# Patient Record
Sex: FEMALE | Race: WHITE | Employment: OTHER | URBAN - NONMETROPOLITAN AREA
[De-identification: names, ages, dates, MRNs, and addresses within clinical notes are randomized per-mention and may not be internally consistent; named-entity substitution may affect disease eponyms.]

---

## 2021-03-03 DIAGNOSIS — Z23 NEED FOR VACCINATION: ICD-10-CM

## 2023-01-30 ENCOUNTER — OFFICE VISIT (OUTPATIENT)
Dept: MEDICAL GROUP | Facility: PHYSICIAN GROUP | Age: 68
End: 2023-01-30
Payer: MEDICARE

## 2023-01-30 VITALS
HEART RATE: 76 BPM | TEMPERATURE: 97.1 F | SYSTOLIC BLOOD PRESSURE: 160 MMHG | RESPIRATION RATE: 16 BRPM | OXYGEN SATURATION: 95 % | DIASTOLIC BLOOD PRESSURE: 94 MMHG | HEIGHT: 63 IN | BODY MASS INDEX: 27.61 KG/M2 | WEIGHT: 155.8 LBS

## 2023-01-30 DIAGNOSIS — E03.9 HYPOTHYROIDISM, UNSPECIFIED TYPE: ICD-10-CM

## 2023-01-30 DIAGNOSIS — I10 PRIMARY HYPERTENSION: ICD-10-CM

## 2023-01-30 DIAGNOSIS — R91.1 PULMONARY NODULE 1 CM OR GREATER IN DIAMETER: ICD-10-CM

## 2023-01-30 DIAGNOSIS — M81.0 AGE-RELATED OSTEOPOROSIS WITHOUT CURRENT PATHOLOGICAL FRACTURE: ICD-10-CM

## 2023-01-30 DIAGNOSIS — N83.202 CYST OF LEFT OVARY: ICD-10-CM

## 2023-01-30 DIAGNOSIS — N18.2 STAGE 2 CHRONIC KIDNEY DISEASE: ICD-10-CM

## 2023-01-30 DIAGNOSIS — Z12.31 ENCOUNTER FOR SCREENING MAMMOGRAM FOR BREAST CANCER: ICD-10-CM

## 2023-01-30 DIAGNOSIS — R07.81 RIB PAIN ON LEFT SIDE: ICD-10-CM

## 2023-01-30 PROCEDURE — 99204 OFFICE O/P NEW MOD 45 MIN: CPT | Performed by: PHYSICIAN ASSISTANT

## 2023-01-30 RX ORDER — LEVOTHYROXINE SODIUM 112 UG/1
112 TABLET ORAL
COMMUNITY
End: 2024-02-09 | Stop reason: SDUPTHER

## 2023-01-30 RX ORDER — AMLODIPINE BESYLATE 5 MG/1
10 TABLET ORAL DAILY
COMMUNITY
Start: 2023-01-29 | End: 2023-02-16

## 2023-01-30 RX ORDER — CYCLOBENZAPRINE HCL 10 MG
1 TABLET ORAL 3 TIMES DAILY PRN
COMMUNITY
Start: 2023-01-29 | End: 2023-05-31

## 2023-01-30 RX ORDER — PROGESTERONE 100 MG/1
100 CAPSULE ORAL DAILY
COMMUNITY
End: 2023-12-06

## 2023-01-30 RX ORDER — ESTRADIOL 0.05 MG/D
1 PATCH TRANSDERMAL
COMMUNITY

## 2023-01-30 RX ORDER — IBUPROFEN 800 MG/1
800 TABLET ORAL
COMMUNITY
Start: 2023-01-29 | End: 2023-02-05

## 2023-01-30 RX ORDER — ALENDRONATE SODIUM 70 MG/1
70 TABLET ORAL
COMMUNITY
End: 2023-03-16

## 2023-01-30 ASSESSMENT — FIBROSIS 4 INDEX: FIB4 SCORE: 1.59

## 2023-01-30 NOTE — PROGRESS NOTES
CC:    Chief Complaint   Patient presents with    [facility]     Follow up ED        HISTORY OF THE PRESENT ILLNESS: Patient is a 68 y.o. female presenting to establish primary care     Pt has pain in L flank and L lower back after a fall while hiking [place]. Pain worsened a few days afterwards after working out at the gym.   Pt has hx of CKD stage 2.   Pt has hx of lung nodules found on CT. Initially seen about 1.5 years ago that showed the nodules. The repeat CT did not look concerning. She is planning to do f/u in a year.   Pt had recent cardiac workup in [place] in which she had CAC score of 0. Also had echo done with normal findings.   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   Pt on fosamax for osteoporosis. Last DEXA done one year ago.   Had colonoscopy in [place] in 2018.     No problem-specific Assessment & Plan notes found for this encounter.    Allergies: Midazolam and Promethazine    Current Outpatient Medications Ordered in Epic   Medication Sig Dispense Refill    amLODIPine (NORVASC) 5 MG Tab Take 5 mg by mouth every day.      cyclobenzaprine (FLEXERIL) 10 mg Tab Take 1 Tablet by mouth 3 times a day as needed.      levothyroxine (SYNTHROID) 112 MCG Tab Take 112 mcg by mouth every morning on an empty stomach.      ibuprofen (MOTRIN) 800 MG Tab Take 800 mg by mouth. (Patient not taking: Reported on 1/30/2023)       No current [facility]-ordered facility-administered medications on file.       No past medical history on file.    No past surgical history on file.    Social History     Tobacco Use    Smoking status: Never    Smokeless tobacco: Never   Vaping Use    Vaping Use: Never used   Substance Use Topics    Alcohol use: Never    Drug use: Never       Social History     Social History Narrative    Not on file       Family History   Problem Relation Age of Onset    Cancer Other         breast       ROS:     - Constitutional: Negative for  "fever, chills, unexpected weight change, and fatigue/generalized weakness.     - HEENT: Negative for headaches, vision changes, hearing changes, ear pain, ear discharge, rhinorrhea, sinus congestion, sore throat, and neck pain.      - Respiratory: Negative for cough, sputum production, chest congestion, dyspnea, wheezing, and crackles.      - Cardiovascular: Negative for chest pain, palpitations, orthopnea, and bilateral lower extremity edema.     - Gastrointestinal: Negative for heartburn, nausea, vomiting, abdominal pain, hematochezia, melena, diarrhea, constipation, and greasy/foul-smelling stools.     - Genitourinary: Negative for dysuria, polyuria, hematuria, pyuria, urinary urgency, and urinary incontinence.     - Musculoskeletal:Positive for rib pain. Negative for myalgias, back pain, and joint pain.     - Skin: Negative for rash, itching, cyanotic skin color change.     - Neurological: Negative for dizziness, tingling, tremors, focal sensory deficit, focal weakness and headaches.     - Endo/Heme/Allergies: Does not bruise/bleed easily.     - Psychiatric/Behavioral: Negative for depression, suicidal/homicidal ideation and memory loss.            .      Exam: BP (!) 160/94   Pulse 76   Temp 36.2 °C (97.1 °F) (Temporal)   Resp 16   Ht 1.6 m (5' 3\")   Wt 70.7 kg (155 lb 12.8 oz)   SpO2 95%  Body mass index is 27.6 kg/m².    General: Normal appearing. No acute distress.  Skin: Warm and dry.  No obvious lesions.  HEENT: Normocephalic. Eyes conjunctiva clear lids without ptosis, ears normal shape and contour  Cardiovascular: Regular rate and rhythm without murmur.   Respiratory: Clear to auscultation bilaterally, no rhonchi wheezing or rales.  Neurologic: Grossly nonfocal, A&O x3, gait normal,  Musculoskeletal: No deformity or swelling.   Extremities: No extremity cyanosis, clubbing, or edema.  Psych: Normal mood and affect. Judgment and insight is normal.    Please note that this dictation was created using " voice recognition software. I have made every reasonable attempt to correct obvious errors, but I expect that there are errors of grammar and possibly content that I did not discover before finalizing the note.      Assessment/Plan  1. Pulmonary nodule 1 cm or greater in diameter  We will get updated CT and review at next visit  - CT-CHEST (THORAX) W/O; Future    2. Cyst of left ovary  Referral placed  - Referral to OB/Gyn    3. Encounter for screening mammogram for breast cancer  Order placed  - MA-SCREENING MAMMO BILAT W/TOMOSYNTHESIS W/CAD; Future    4. Hypothyroidism, unspecified type  Continue levothyroxine at current dose.  Check TSH review at next visit  - TSH WITH REFLEX TO FT4; Future    5. Primary hypertension  I advised her to take another tablet of Norvasc when she gets home today.  She will take her blood pressure readings and we will review these in several weeks    6. Rib pain on left side  Advised this is likely a bruised rib    7. Age-related osteoporosis without current pathological fracture  Continue Fosamax    8. Stage 2 chronic kidney disease  Continue to monitor

## 2023-02-09 ENCOUNTER — HOSPITAL ENCOUNTER (OUTPATIENT)
Dept: RADIOLOGY | Facility: MEDICAL CENTER | Age: 68
End: 2023-02-09
Payer: MEDICARE

## 2023-02-13 ENCOUNTER — HOSPITAL ENCOUNTER (OUTPATIENT)
Dept: RADIOLOGY | Facility: MEDICAL CENTER | Age: 68
End: 2023-02-13
Attending: PHYSICIAN ASSISTANT
Payer: MEDICARE

## 2023-02-13 DIAGNOSIS — Z12.31 ENCOUNTER FOR SCREENING MAMMOGRAM FOR BREAST CANCER: ICD-10-CM

## 2023-02-13 PROCEDURE — 77063 BREAST TOMOSYNTHESIS BI: CPT

## 2023-02-16 ENCOUNTER — OFFICE VISIT (OUTPATIENT)
Dept: MEDICAL GROUP | Facility: PHYSICIAN GROUP | Age: 68
End: 2023-02-16
Payer: MEDICARE

## 2023-02-16 VITALS
RESPIRATION RATE: 14 BRPM | BODY MASS INDEX: 26.9 KG/M2 | SYSTOLIC BLOOD PRESSURE: 132 MMHG | DIASTOLIC BLOOD PRESSURE: 70 MMHG | HEART RATE: 89 BPM | WEIGHT: 151.8 LBS | HEIGHT: 63 IN | OXYGEN SATURATION: 97 % | TEMPERATURE: 98.2 F

## 2023-02-16 DIAGNOSIS — I10 PRIMARY HYPERTENSION: ICD-10-CM

## 2023-02-16 DIAGNOSIS — R91.1 PULMONARY NODULE 1 CM OR GREATER IN DIAMETER: ICD-10-CM

## 2023-02-16 DIAGNOSIS — E03.9 HYPOTHYROIDISM, UNSPECIFIED TYPE: ICD-10-CM

## 2023-02-16 PROCEDURE — 99214 OFFICE O/P EST MOD 30 MIN: CPT | Performed by: PHYSICIAN ASSISTANT

## 2023-02-16 RX ORDER — AMLODIPINE BESYLATE 10 MG/1
10 TABLET ORAL DAILY
Qty: 90 TABLET | Refills: 1 | Status: SHIPPED | OUTPATIENT
Start: 2023-02-16 | End: 2023-10-31 | Stop reason: SDUPTHER

## 2023-02-16 ASSESSMENT — FIBROSIS 4 INDEX: FIB4 SCORE: 1.59

## 2023-02-16 ASSESSMENT — PATIENT HEALTH QUESTIONNAIRE - PHQ9: CLINICAL INTERPRETATION OF PHQ2 SCORE: 0

## 2023-02-16 NOTE — PROGRESS NOTES
CC:  Chief Complaint   Patient presents with    Follow-Up     BP follow up.        HISTORY OF PRESENT ILLNESS: Patient is a 68 y.o. female established patient presenting for recheck  Pt has been checking her home BP. Has been on norvasc 10mg and readings have been good overall.   Pt's abdominal CT indicates seeming no changes to her pulmonary nodules from when previous CT chest done in October 2021.   Pt had mammo done with R breast asymmetry. Diagnostic mammo scheduled next week.     No problem-specific Assessment & Plan notes found for this encounter.      Patient Active Problem List    Diagnosis Date Noted    Hypothyroidism 01/30/2023    Cyst of left ovary 01/30/2023    Pulmonary nodule 1 cm or greater in diameter 01/30/2023    Primary hypertension 01/30/2023    Age-related osteoporosis without current pathological fracture 01/30/2023    Stage 2 chronic kidney disease 01/30/2023      Allergies:Midazolam and Promethazine    Current Outpatient Medications   Medication Sig Dispense Refill    levothyroxine (SYNTHROID) 112 MCG Tab Take 112 mcg by mouth every morning on an empty stomach.      progesterone (PROMETRIUM) 100 MG Cap Take 100 mg by mouth every day.      estradiol (CLIMARA) 0.05 MG/24HR PATCH WEEKLY Place 1 Patch on the skin every 7 days.      alendronate (FOSAMAX) 70 MG Tab Take 70 mg by mouth every 7 days.      amLODIPine (NORVASC) 5 MG Tab Take 10 mg by mouth every day.      cyclobenzaprine (FLEXERIL) 10 mg Tab Take 1 Tablet by mouth 3 times a day as needed. (Patient not taking: Reported on 2/16/2023)       No current facility-administered medications for this visit.       Social History     Tobacco Use    Smoking status: Never    Smokeless tobacco: Never   Vaping Use    Vaping Use: Never used   Substance Use Topics    Alcohol use: Never    Drug use: Never     Social History     Social History Narrative    Not on file       Family History   Problem Relation Age of Onset    Cancer Other         breast  "       ROS:     - Constitutional:  Negative for fever, chills, unexpected weight change, and fatigue/generalized weakness.    - HEENT:  Negative for headaches, vision changes, hearing changes, ear pain, ear discharge, rhinorrhea, sinus congestion, sore throat, and neck pain.      - Respiratory: Negative for cough, sputum production, chest congestion, dyspnea, wheezing, and crackles.      - Cardiovascular: Negative for chest pain, palpitations, orthopnea, and bilateral lower extremity edema.        Exam:    /70 (BP Location: Right arm, Patient Position: Sitting, BP Cuff Size: Adult)   Pulse 89   Temp 36.8 °C (98.2 °F) (Temporal)   Resp 14   Ht 1.588 m (5' 2.5\")   Wt 68.9 kg (151 lb 12.8 oz)   SpO2 97%  Body mass index is 27.32 kg/m².    General:  Well nourished, well developed female in NAD  Head is grossly normal.  Neck: Supple. Thyroid is not enlarged.  Skin: Warm and dry. No obvious lesions  Neuro: Normal muscle tone. Gait normal. Alert and oriented.  Psych: Normal mood and affect      Please note that this dictation was created using voice recognition software. I have made every reasonable attempt to correct obvious errors, but I expect that there are errors of grammar and possibly content that I did not discover before finalizing the note.        Assessment/Plan:  1. Primary hypertension  Continue with norvasc 10mg  - amLODIPine (NORVASC) 10 MG Tab; Take 1 Tablet by mouth every day for 180 days.  Dispense: 90 Tablet; Refill: 1    2. Hypothyroidism, unspecified type  Continue with levothyroxine at current dose.     3. Pulmonary nodule 1 cm or greater in diameter  Will get chest CT done in one year since her abdominal CT visualized the nodules in question and did not appear to change since imaging report in October 2021           "

## 2023-02-22 ENCOUNTER — HOSPITAL ENCOUNTER (OUTPATIENT)
Dept: RADIOLOGY | Facility: MEDICAL CENTER | Age: 68
End: 2023-02-22
Attending: PHYSICIAN ASSISTANT
Payer: MEDICARE

## 2023-02-22 DIAGNOSIS — R92.8 ABNORMAL MAMMOGRAM: ICD-10-CM

## 2023-02-22 PROCEDURE — 76642 ULTRASOUND BREAST LIMITED: CPT | Mod: RT

## 2023-02-22 PROCEDURE — G0279 TOMOSYNTHESIS, MAMMO: HCPCS

## 2023-02-28 ENCOUNTER — APPOINTMENT (OUTPATIENT)
Dept: MEDICAL GROUP | Facility: PHYSICIAN GROUP | Age: 68
End: 2023-02-28
Payer: MEDICARE

## 2023-03-16 RX ORDER — ALENDRONATE SODIUM 70 MG/1
TABLET ORAL
Qty: 12 TABLET | Refills: 0 | Status: SHIPPED | OUTPATIENT
Start: 2023-03-16 | End: 2023-06-14

## 2023-05-31 ENCOUNTER — OFFICE VISIT (OUTPATIENT)
Dept: MEDICAL GROUP | Facility: PHYSICIAN GROUP | Age: 68
End: 2023-05-31
Payer: MEDICARE

## 2023-05-31 VITALS
DIASTOLIC BLOOD PRESSURE: 76 MMHG | HEIGHT: 63 IN | TEMPERATURE: 97.5 F | HEART RATE: 83 BPM | SYSTOLIC BLOOD PRESSURE: 142 MMHG | WEIGHT: 156.8 LBS | BODY MASS INDEX: 27.78 KG/M2 | OXYGEN SATURATION: 97 %

## 2023-05-31 DIAGNOSIS — R73.09 ELEVATED GLUCOSE: ICD-10-CM

## 2023-05-31 DIAGNOSIS — E55.9 VITAMIN D DEFICIENCY: ICD-10-CM

## 2023-05-31 DIAGNOSIS — I70.0 ATHEROSCLEROSIS OF ABDOMINAL AORTA (HCC): ICD-10-CM

## 2023-05-31 DIAGNOSIS — E78.2 MIXED HYPERLIPIDEMIA: ICD-10-CM

## 2023-05-31 DIAGNOSIS — E03.9 HYPOTHYROIDISM, UNSPECIFIED TYPE: ICD-10-CM

## 2023-05-31 DIAGNOSIS — Z15.89 APOE*3/*4 GENOTYPE: ICD-10-CM

## 2023-05-31 DIAGNOSIS — N83.202 CYST OF LEFT OVARY: ICD-10-CM

## 2023-05-31 DIAGNOSIS — I10 PRIMARY HYPERTENSION: ICD-10-CM

## 2023-05-31 PROCEDURE — 3077F SYST BP >= 140 MM HG: CPT | Performed by: PHYSICIAN ASSISTANT

## 2023-05-31 PROCEDURE — 99214 OFFICE O/P EST MOD 30 MIN: CPT | Performed by: PHYSICIAN ASSISTANT

## 2023-05-31 PROCEDURE — 3078F DIAST BP <80 MM HG: CPT | Performed by: PHYSICIAN ASSISTANT

## 2023-05-31 RX ORDER — ROSUVASTATIN CALCIUM 10 MG/1
10 TABLET, COATED ORAL DAILY
Qty: 90 TABLET | Refills: 3 | Status: SHIPPED | OUTPATIENT
Start: 2023-05-31

## 2023-05-31 RX ORDER — LOSARTAN POTASSIUM 25 MG/1
25 TABLET ORAL DAILY
Qty: 90 TABLET | Refills: 3 | Status: SHIPPED | OUTPATIENT
Start: 2023-05-31

## 2023-05-31 ASSESSMENT — FIBROSIS 4 INDEX: FIB4 SCORE: 1.59

## 2023-05-31 NOTE — PROGRESS NOTES
"CC:  Chief Complaint   Patient presents with    Follow-Up       HISTORY OF PRESENT ILLNESS: Patient is a 68 y.o. female established patient presenting with issues below  Requesting OBGYN referral to [facility]  Pt threw away her Crestor because she is concerned about dementia. Has ApoE positive test.   Pt follows a neurologist in [facility] to help her prevent dementia.  There are several labs that she is requesting to have done.    Current Outpatient Medications   Medication Sig Dispense Refill    alendronate (FOSAMAX) 70 MG Tab TAKE ONE TABLET BY MOUTH WEEKLY 12 Tablet 0    amLODIPine (NORVASC) 10 MG Tab Take 1 Tablet by mouth every day for 180 days. 90 Tablet 1    levothyroxine (SYNTHROID) 112 MCG Tab Take 112 mcg by mouth every morning on an empty stomach.      progesterone (PROMETRIUM) 100 MG Cap Take 100 mg by mouth every day.      estradiol (CLIMARA) 0.05 MG/24HR PATCH WEEKLY Place 1 Patch on the skin every 7 days.       No current facility-administered medications for this visit.        ROS:     ROS    Exam:    BP (!) 142/76   Pulse 83   Temp 36.4 °C (97.5 °F) (Temporal)   Ht 1.6 m (5' 3\")   Wt 71.1 kg (156 lb 12.8 oz)   SpO2 97%  Body mass index is 27.78 kg/m².    General:  Well nourished, well developed female in NAD  Head is grossly normal.  Neck: Supple.   Skin: Warm and dry. No obvious lesions  Neuro: Normal muscle tone. Gait normal. Alert and oriented.  Psych: Normal mood and affect      Please note that this dictation was created using voice recognition software. I have made every reasonable attempt to correct obvious errors, but I expect that there are errors of grammar and possibly content that I did not discover before finalizing the note.        Assessment/Plan:    1. Cyst of left ovary  Referral placed  - Referral to OB/Gyn    2. Mixed hyperlipidemia  Start on Crestor 10 mg.  She had a CAC score of 0 but has abdominal aortic atherosclerosis.  - Lipid Profile; Future  - " rosuvastatin (CRESTOR) 10 MG Tab; Take 1 Tablet by mouth every day.  Dispense: 90 Tablet; Refill: 3    3. APOE*3/*4 genotype    - CRP HIGH SENSITIVE  - FERRITIN; Future  - HOMOCYSTEINE; Future    4. Primary hypertension  Her blood pressure today is elevated.  We will add on losartan 25 mg.  Recheck in 1 month  - CBC WITH DIFFERENTIAL; Future  - Comp Metabolic Panel; Future  - losartan (COZAAR) 25 MG Tab; Take 1 Tablet by mouth every day.  Dispense: 90 Tablet; Refill: 3    5. Hypothyroidism, unspecified type  Continue to monitor  - TSH WITH REFLEX TO FT4; Future    6. Vitamin D deficiency  Continue to monitor  - VITAMIN D,25 HYDROXY (DEFICIENCY); Future    7. Elevated glucose    - HEMOGLOBIN A1C; Future  - INSULIN FASTING; Future    8. Atherosclerosis of abdominal aorta (HCC)  Start on Crestor.

## 2023-06-08 ENCOUNTER — HOSPITAL ENCOUNTER (OUTPATIENT)
Dept: LAB | Facility: MEDICAL CENTER | Age: 68
End: 2023-06-08
Attending: PHYSICIAN ASSISTANT
Payer: MEDICARE

## 2023-06-08 DIAGNOSIS — E55.9 VITAMIN D DEFICIENCY: ICD-10-CM

## 2023-06-08 DIAGNOSIS — E03.9 HYPOTHYROIDISM, UNSPECIFIED TYPE: ICD-10-CM

## 2023-06-08 DIAGNOSIS — R73.09 ELEVATED GLUCOSE: ICD-10-CM

## 2023-06-08 DIAGNOSIS — I10 PRIMARY HYPERTENSION: ICD-10-CM

## 2023-06-08 DIAGNOSIS — E78.2 MIXED HYPERLIPIDEMIA: ICD-10-CM

## 2023-06-08 DIAGNOSIS — Z15.89 APOE*3/*4 GENOTYPE: ICD-10-CM

## 2023-06-08 LAB
25(OH)D3 SERPL-MCNC: 62 NG/ML (ref 30–100)
ALBUMIN SERPL BCP-MCNC: 4.4 G/DL (ref 3.2–4.9)
ALBUMIN/GLOB SERPL: 1.8 G/DL
ALP SERPL-CCNC: 44 U/L (ref 30–99)
ALT SERPL-CCNC: 16 U/L (ref 2–50)
ANION GAP SERPL CALC-SCNC: 13 MMOL/L (ref 7–16)
AST SERPL-CCNC: 15 U/L (ref 12–45)
BASOPHILS # BLD AUTO: 0.9 % (ref 0–1.8)
BASOPHILS # BLD: 0.05 K/UL (ref 0–0.12)
BILIRUB SERPL-MCNC: 0.6 MG/DL (ref 0.1–1.5)
BUN SERPL-MCNC: 29 MG/DL (ref 8–22)
CALCIUM ALBUM COR SERPL-MCNC: 9.3 MG/DL (ref 8.5–10.5)
CALCIUM SERPL-MCNC: 9.6 MG/DL (ref 8.5–10.5)
CHLORIDE SERPL-SCNC: 105 MMOL/L (ref 96–112)
CHOLEST SERPL-MCNC: 243 MG/DL (ref 100–199)
CO2 SERPL-SCNC: 22 MMOL/L (ref 20–33)
CREAT SERPL-MCNC: 0.93 MG/DL (ref 0.5–1.4)
CRP SERPL HS-MCNC: 0.9 MG/L (ref 0–3)
EOSINOPHIL # BLD AUTO: 0.16 K/UL (ref 0–0.51)
EOSINOPHIL NFR BLD: 3 % (ref 0–6.9)
ERYTHROCYTE [DISTWIDTH] IN BLOOD BY AUTOMATED COUNT: 38.4 FL (ref 35.9–50)
EST. AVERAGE GLUCOSE BLD GHB EST-MCNC: 88 MG/DL
FERRITIN SERPL-MCNC: 32.2 NG/ML (ref 10–291)
GFR SERPLBLD CREATININE-BSD FMLA CKD-EPI: 66 ML/MIN/1.73 M 2
GLOBULIN SER CALC-MCNC: 2.4 G/DL (ref 1.9–3.5)
GLUCOSE SERPL-MCNC: 105 MG/DL (ref 65–99)
HBA1C MFR BLD: 4.7 % (ref 4–5.6)
HCT VFR BLD AUTO: 45.7 % (ref 37–47)
HCYS SERPL-SCNC: 8.72 UMOL/L
HDLC SERPL-MCNC: 89 MG/DL
HGB BLD-MCNC: 15.7 G/DL (ref 12–16)
IMM GRANULOCYTES # BLD AUTO: 0.01 K/UL (ref 0–0.11)
IMM GRANULOCYTES NFR BLD AUTO: 0.2 % (ref 0–0.9)
LDLC SERPL CALC-MCNC: 143 MG/DL
LYMPHOCYTES # BLD AUTO: 1.6 K/UL (ref 1–4.8)
LYMPHOCYTES NFR BLD: 30.1 % (ref 22–41)
MCH RBC QN AUTO: 30 PG (ref 27–33)
MCHC RBC AUTO-ENTMCNC: 34.4 G/DL (ref 32.2–35.5)
MCV RBC AUTO: 87.2 FL (ref 81.4–97.8)
MONOCYTES # BLD AUTO: 0.39 K/UL (ref 0–0.85)
MONOCYTES NFR BLD AUTO: 7.3 % (ref 0–13.4)
NEUTROPHILS # BLD AUTO: 3.11 K/UL (ref 1.82–7.42)
NEUTROPHILS NFR BLD: 58.5 % (ref 44–72)
NRBC # BLD AUTO: 0 K/UL
NRBC BLD-RTO: 0 /100 WBC (ref 0–0.2)
PLATELET # BLD AUTO: 229 K/UL (ref 164–446)
PMV BLD AUTO: 10.5 FL (ref 9–12.9)
POTASSIUM SERPL-SCNC: 4.2 MMOL/L (ref 3.6–5.5)
PROT SERPL-MCNC: 6.8 G/DL (ref 6–8.2)
RBC # BLD AUTO: 5.24 M/UL (ref 4.2–5.4)
SODIUM SERPL-SCNC: 140 MMOL/L (ref 135–145)
TRIGL SERPL-MCNC: 54 MG/DL (ref 0–149)
TSH SERPL DL<=0.005 MIU/L-ACNC: 0.49 UIU/ML (ref 0.38–5.33)
WBC # BLD AUTO: 5.3 K/UL (ref 4.8–10.8)

## 2023-06-08 PROCEDURE — 36415 COLL VENOUS BLD VENIPUNCTURE: CPT

## 2023-06-08 PROCEDURE — 82728 ASSAY OF FERRITIN: CPT | Mod: GA

## 2023-06-08 PROCEDURE — 84443 ASSAY THYROID STIM HORMONE: CPT

## 2023-06-08 PROCEDURE — 83090 ASSAY OF HOMOCYSTEINE: CPT

## 2023-06-08 PROCEDURE — 86141 C-REACTIVE PROTEIN HS: CPT

## 2023-06-08 PROCEDURE — 85025 COMPLETE CBC W/AUTO DIFF WBC: CPT

## 2023-06-08 PROCEDURE — 80053 COMPREHEN METABOLIC PANEL: CPT

## 2023-06-08 PROCEDURE — 83036 HEMOGLOBIN GLYCOSYLATED A1C: CPT | Mod: GA

## 2023-06-08 PROCEDURE — 82306 VITAMIN D 25 HYDROXY: CPT

## 2023-06-08 PROCEDURE — 80061 LIPID PANEL: CPT

## 2023-06-08 PROCEDURE — 83525 ASSAY OF INSULIN: CPT

## 2023-06-09 LAB — INSULIN P FAST SERPL-ACNC: 7 UIU/ML (ref 3–25)

## 2023-06-12 ENCOUNTER — TELEPHONE (OUTPATIENT)
Dept: HEALTH INFORMATION MANAGEMENT | Facility: OTHER | Age: 68
End: 2023-06-12
Payer: MEDICARE

## 2023-06-14 RX ORDER — ALENDRONATE SODIUM 70 MG/1
TABLET ORAL
Qty: 12 TABLET | Refills: 0 | Status: SHIPPED
Start: 2023-06-14 | End: 2023-08-25

## 2023-06-22 ENCOUNTER — TELEPHONE (OUTPATIENT)
Dept: SCHEDULING | Facility: IMAGING CENTER | Age: 68
End: 2023-06-22

## 2023-06-22 DIAGNOSIS — N28.1 RENAL CYST: ICD-10-CM

## 2023-06-23 ENCOUNTER — TELEPHONE (OUTPATIENT)
Dept: MEDICAL GROUP | Facility: PHYSICIAN GROUP | Age: 68
End: 2023-06-23
Payer: MEDICARE

## 2023-06-23 ENCOUNTER — HOSPITAL ENCOUNTER (OUTPATIENT)
Dept: RADIOLOGY | Facility: MEDICAL CENTER | Age: 68
End: 2023-06-23
Attending: STUDENT IN AN ORGANIZED HEALTH CARE EDUCATION/TRAINING PROGRAM
Payer: MEDICARE

## 2023-06-23 DIAGNOSIS — N28.1 RENAL CYST, RIGHT: ICD-10-CM

## 2023-06-23 NOTE — TELEPHONE ENCOUNTER
Spoke to radiology this morning at [facility] to go over her recent CT abdomen with contrast scan done back in January showing a renal cyst.  The cyst is about 3.9 cm and the radiologist describes as a simple cyst with no need for follow-up imaging.  Spoke with the patient about this and conveyed this information to her.  She would like to get an ultrasound for follow-up imaging to see if it is getting any larger.  We will go forward and order an ultrasound, canceled the recent CT scan as well.    Grace Simms DO

## 2023-06-30 ENCOUNTER — HOSPITAL ENCOUNTER (OUTPATIENT)
Dept: RADIOLOGY | Facility: MEDICAL CENTER | Age: 68
End: 2023-06-30
Attending: STUDENT IN AN ORGANIZED HEALTH CARE EDUCATION/TRAINING PROGRAM
Payer: MEDICARE

## 2023-06-30 DIAGNOSIS — N28.1 RENAL CYST, RIGHT: ICD-10-CM

## 2023-06-30 PROCEDURE — 76775 US EXAM ABDO BACK WALL LIM: CPT

## 2023-07-03 ENCOUNTER — APPOINTMENT (OUTPATIENT)
Dept: MEDICAL GROUP | Facility: PHYSICIAN GROUP | Age: 68
End: 2023-07-03
Payer: MEDICARE

## 2023-07-03 ENCOUNTER — PATIENT MESSAGE (OUTPATIENT)
Dept: MEDICAL GROUP | Facility: PHYSICIAN GROUP | Age: 68
End: 2023-07-03

## 2023-07-03 NOTE — TELEPHONE ENCOUNTER
----- Message from Rodríguez Simms D.O. sent at 6/30/2023  5:22 PM PDT -----  US again demonstrates a simply kidney cyst. No follow up needed    Grace Simms DO

## 2023-07-09 SDOH — ECONOMIC STABILITY: HOUSING INSECURITY: IN THE LAST 12 MONTHS, HOW MANY PLACES HAVE YOU LIVED?: 3

## 2023-07-09 SDOH — ECONOMIC STABILITY: HOUSING INSECURITY
IN THE LAST 12 MONTHS, WAS THERE A TIME WHEN YOU DID NOT HAVE A STEADY PLACE TO SLEEP OR SLEPT IN A SHELTER (INCLUDING NOW)?: NO

## 2023-07-09 SDOH — ECONOMIC STABILITY: TRANSPORTATION INSECURITY
IN THE PAST 12 MONTHS, HAS THE LACK OF TRANSPORTATION KEPT YOU FROM MEDICAL APPOINTMENTS OR FROM GETTING MEDICATIONS?: NO

## 2023-07-09 SDOH — ECONOMIC STABILITY: FOOD INSECURITY: WITHIN THE PAST 12 MONTHS, THE FOOD YOU BOUGHT JUST DIDN'T LAST AND YOU DIDN'T HAVE MONEY TO GET MORE.: NEVER TRUE

## 2023-07-09 SDOH — HEALTH STABILITY: PHYSICAL HEALTH: ON AVERAGE, HOW MANY MINUTES DO YOU ENGAGE IN EXERCISE AT THIS LEVEL?: 130 MIN

## 2023-07-09 SDOH — ECONOMIC STABILITY: INCOME INSECURITY: HOW HARD IS IT FOR YOU TO PAY FOR THE VERY BASICS LIKE FOOD, HOUSING, MEDICAL CARE, AND HEATING?: NOT VERY HARD

## 2023-07-09 SDOH — ECONOMIC STABILITY: TRANSPORTATION INSECURITY
IN THE PAST 12 MONTHS, HAS LACK OF RELIABLE TRANSPORTATION KEPT YOU FROM MEDICAL APPOINTMENTS, MEETINGS, WORK OR FROM GETTING THINGS NEEDED FOR DAILY LIVING?: NO

## 2023-07-09 SDOH — ECONOMIC STABILITY: INCOME INSECURITY: IN THE LAST 12 MONTHS, WAS THERE A TIME WHEN YOU WERE NOT ABLE TO PAY THE MORTGAGE OR RENT ON TIME?: NO

## 2023-07-09 SDOH — HEALTH STABILITY: PHYSICAL HEALTH: ON AVERAGE, HOW MANY DAYS PER WEEK DO YOU ENGAGE IN MODERATE TO STRENUOUS EXERCISE (LIKE A BRISK WALK)?: 7 DAYS

## 2023-07-09 SDOH — HEALTH STABILITY: MENTAL HEALTH
STRESS IS WHEN SOMEONE FEELS TENSE, NERVOUS, ANXIOUS, OR CAN'T SLEEP AT NIGHT BECAUSE THEIR MIND IS TROUBLED. HOW STRESSED ARE YOU?: TO SOME EXTENT

## 2023-07-09 SDOH — ECONOMIC STABILITY: FOOD INSECURITY: WITHIN THE PAST 12 MONTHS, YOU WORRIED THAT YOUR FOOD WOULD RUN OUT BEFORE YOU GOT MONEY TO BUY MORE.: NEVER TRUE

## 2023-07-09 SDOH — ECONOMIC STABILITY: TRANSPORTATION INSECURITY
IN THE PAST 12 MONTHS, HAS LACK OF TRANSPORTATION KEPT YOU FROM MEETINGS, WORK, OR FROM GETTING THINGS NEEDED FOR DAILY LIVING?: NO

## 2023-07-09 ASSESSMENT — SOCIAL DETERMINANTS OF HEALTH (SDOH)
HOW OFTEN DO YOU ATTEND CHURCH OR RELIGIOUS SERVICES?: NEVER
HOW HARD IS IT FOR YOU TO PAY FOR THE VERY BASICS LIKE FOOD, HOUSING, MEDICAL CARE, AND HEATING?: NOT VERY HARD
DO YOU BELONG TO ANY CLUBS OR ORGANIZATIONS SUCH AS CHURCH GROUPS UNIONS, FRATERNAL OR ATHLETIC GROUPS, OR SCHOOL GROUPS?: YES
HOW OFTEN DO YOU ATTEND CHURCH OR RELIGIOUS SERVICES?: NEVER
HOW OFTEN DO YOU ATTENT MEETINGS OF THE CLUB OR ORGANIZATION YOU BELONG TO?: 1 TO 4 TIMES PER YEAR
HOW OFTEN DO YOU HAVE SIX OR MORE DRINKS ON ONE OCCASION: NEVER
HOW OFTEN DO YOU ATTENT MEETINGS OF THE CLUB OR ORGANIZATION YOU BELONG TO?: 1 TO 4 TIMES PER YEAR
HOW OFTEN DO YOU GET TOGETHER WITH FRIENDS OR RELATIVES?: ONCE A WEEK
IN A TYPICAL WEEK, HOW MANY TIMES DO YOU TALK ON THE PHONE WITH FAMILY, FRIENDS, OR NEIGHBORS?: PATIENT DECLINED
IN A TYPICAL WEEK, HOW MANY TIMES DO YOU TALK ON THE PHONE WITH FAMILY, FRIENDS, OR NEIGHBORS?: PATIENT DECLINED
HOW OFTEN DO YOU GET TOGETHER WITH FRIENDS OR RELATIVES?: ONCE A WEEK
WITHIN THE PAST 12 MONTHS, YOU WORRIED THAT YOUR FOOD WOULD RUN OUT BEFORE YOU GOT THE MONEY TO BUY MORE: NEVER TRUE
HOW MANY DRINKS CONTAINING ALCOHOL DO YOU HAVE ON A TYPICAL DAY WHEN YOU ARE DRINKING: PATIENT DOES NOT DRINK
DO YOU BELONG TO ANY CLUBS OR ORGANIZATIONS SUCH AS CHURCH GROUPS UNIONS, FRATERNAL OR ATHLETIC GROUPS, OR SCHOOL GROUPS?: YES
HOW OFTEN DO YOU HAVE A DRINK CONTAINING ALCOHOL: NEVER

## 2023-07-09 ASSESSMENT — LIFESTYLE VARIABLES
AUDIT-C TOTAL SCORE: 0
HOW MANY STANDARD DRINKS CONTAINING ALCOHOL DO YOU HAVE ON A TYPICAL DAY: PATIENT DOES NOT DRINK
HOW OFTEN DO YOU HAVE SIX OR MORE DRINKS ON ONE OCCASION: NEVER
HOW OFTEN DO YOU HAVE A DRINK CONTAINING ALCOHOL: NEVER
SKIP TO QUESTIONS 9-10: 1

## 2023-07-10 ENCOUNTER — APPOINTMENT (OUTPATIENT)
Dept: MEDICAL GROUP | Facility: PHYSICIAN GROUP | Age: 68
End: 2023-07-10
Payer: MEDICARE

## 2023-08-10 ENCOUNTER — OFFICE VISIT (OUTPATIENT)
Dept: MEDICAL GROUP | Facility: PHYSICIAN GROUP | Age: 68
End: 2023-08-10
Payer: MEDICARE

## 2023-08-10 VITALS
WEIGHT: 156.31 LBS | HEIGHT: 63 IN | RESPIRATION RATE: 12 BRPM | OXYGEN SATURATION: 97 % | BODY MASS INDEX: 27.7 KG/M2 | DIASTOLIC BLOOD PRESSURE: 74 MMHG | TEMPERATURE: 97.7 F | SYSTOLIC BLOOD PRESSURE: 144 MMHG | HEART RATE: 101 BPM

## 2023-08-10 DIAGNOSIS — N28.1 SIMPLE RENAL CYST: ICD-10-CM

## 2023-08-10 DIAGNOSIS — Z15.89 APOE*3/*4 GENOTYPE: ICD-10-CM

## 2023-08-10 DIAGNOSIS — M81.0 AGE-RELATED OSTEOPOROSIS WITHOUT CURRENT PATHOLOGICAL FRACTURE: ICD-10-CM

## 2023-08-10 DIAGNOSIS — I10 PRIMARY HYPERTENSION: ICD-10-CM

## 2023-08-10 DIAGNOSIS — E78.5 HYPERLIPIDEMIA, UNSPECIFIED HYPERLIPIDEMIA TYPE: ICD-10-CM

## 2023-08-10 DIAGNOSIS — N83.202 CYST OF LEFT OVARY: ICD-10-CM

## 2023-08-10 DIAGNOSIS — R91.1 PULMONARY NODULE 1 CM OR GREATER IN DIAMETER: ICD-10-CM

## 2023-08-10 DIAGNOSIS — E03.9 HYPOTHYROIDISM, UNSPECIFIED TYPE: ICD-10-CM

## 2023-08-10 DIAGNOSIS — N18.2 STAGE 2 CHRONIC KIDNEY DISEASE: ICD-10-CM

## 2023-08-10 DIAGNOSIS — E78.2 MIXED HYPERLIPIDEMIA: ICD-10-CM

## 2023-08-10 PROCEDURE — 3077F SYST BP >= 140 MM HG: CPT | Performed by: STUDENT IN AN ORGANIZED HEALTH CARE EDUCATION/TRAINING PROGRAM

## 2023-08-10 PROCEDURE — 3078F DIAST BP <80 MM HG: CPT | Performed by: STUDENT IN AN ORGANIZED HEALTH CARE EDUCATION/TRAINING PROGRAM

## 2023-08-10 PROCEDURE — 99214 OFFICE O/P EST MOD 30 MIN: CPT | Performed by: STUDENT IN AN ORGANIZED HEALTH CARE EDUCATION/TRAINING PROGRAM

## 2023-08-10 RX ORDER — EZETIMIBE 10 MG/1
10 TABLET ORAL DAILY
Qty: 30 TABLET | Refills: 2 | Status: SHIPPED | OUTPATIENT
Start: 2023-08-10 | End: 2023-11-07

## 2023-08-10 ASSESSMENT — ENCOUNTER SYMPTOMS
ORTHOPNEA: 0
NAUSEA: 0
SHORTNESS OF BREATH: 0
CHILLS: 0
ABDOMINAL PAIN: 0
PALPITATIONS: 0
HEADACHES: 0
COUGH: 0
WHEEZING: 0
VOMITING: 0
FEVER: 0
DIZZINESS: 0
FOCAL WEAKNESS: 0

## 2023-08-10 ASSESSMENT — FIBROSIS 4 INDEX: FIB4 SCORE: 1.15

## 2023-08-10 NOTE — ASSESSMENT & PLAN NOTE
US 8/2023:  Impression      1.  Relatively stable simple appearing left ovarian cyst.   2.  Normal right ovary and uterus.   3.  Consider ongoing surveillance ultrasound follow-up of the cyst in 6-12   months.       Wants to be referred to Gyn Dr. Diaz

## 2023-08-10 NOTE — ASSESSMENT & PLAN NOTE
ASCVD risk about 1%.  She reports that she is currently on Crestor 10 mg daily but would not like to increase this medication.  Instead she would like to add Zetia 10 mg daily which she heard also helps with the disease process of Alzheimer disease as she is apoe positive

## 2023-08-10 NOTE — ASSESSMENT & PLAN NOTE
Media in chart, 2 nodules 8 mm and 16 mm that are stable images 1 year apart.  Can consider imaging again in 2 to 3 years.

## 2023-08-10 NOTE — PROGRESS NOTES
HISTORY OF PRESENT ILLNESS: Estefani is a pleasant 68 y.o. female, established patient who presents today to discuss medical problems as listed below:    Problem   Simple Renal Cyst    R side seen on US 6/2023, no follow up needed. Simple cyst.      Hyperlipidemia   Hypothyroidism   Cyst of Left Ovary   Pulmonary Nodule 1 Cm Or Greater in Diameter   Primary Hypertension    History of whitecoat hypertension.  At home about 1 20-1 30 systolic over 80 diastolic.  Currently on amlodipine 10 mg daily, losartan 25 mg daily     Age-Related Osteoporosis Without Current Pathological Fracture    Has been on alendronate for 1.5 years     Next dexa scan 6 months from now                                Stage 2 Chronic Kidney Disease        Current Outpatient Medications Ordered in Epic   Medication Sig Dispense Refill    ezetimibe (ZETIA) 10 MG Tab Take 1 Tablet by mouth every day. 30 Tablet 2    alendronate (FOSAMAX) 70 MG Tab TAKE 1 TABLET BY MOUTH WEEKLY 12 Tablet 0    rosuvastatin (CRESTOR) 10 MG Tab Take 1 Tablet by mouth every day. 90 Tablet 3    losartan (COZAAR) 25 MG Tab Take 1 Tablet by mouth every day. 90 Tablet 3    amLODIPine (NORVASC) 10 MG Tab Take 1 Tablet by mouth every day for 180 days. 90 Tablet 1    levothyroxine (SYNTHROID) 112 MCG Tab Take 112 mcg by mouth every morning on an empty stomach.      progesterone (PROMETRIUM) 100 MG Cap Take 100 mg by mouth every day.      estradiol (CLIMARA) 0.05 MG/24HR PATCH WEEKLY Place 1 Patch on the skin every 7 days.       No current [facility]-ordered facility-administered medications on file.       Review of systems:  Review of Systems   Constitutional:  Negative for chills and fever.   Respiratory:  Negative for cough, shortness of breath and wheezing.    Cardiovascular:  Negative for chest pain, palpitations, orthopnea and leg swelling.   Gastrointestinal:  Negative for abdominal pain, nausea and vomiting.   Genitourinary:  Negative for dysuria.   Musculoskeletal:  Negative  "for joint pain.   Neurological:  Negative for dizziness, focal weakness and headaches.         Past Medical History:   Diagnosis Date    Hyperlipidemia     Hypertension     Kidney disease     Thyroid disease      Past Surgical History:   Procedure Laterality Date    PRIMARY C SECTION      TUBAL COAGULATION LAPAROSCOPIC BILATERAL       Social History     Tobacco Use    Smoking status: Never    Smokeless tobacco: Never    Tobacco comments:     2 months in college   Vaping Use    Vaping Use: Never used   Substance Use Topics    Alcohol use: Not Currently     Comment: Neuro toxin - I do not drink    Drug use: Never      Family History   Problem Relation Age of Onset    Cancer Other         breast    Heart Disease Mother         heart failure    Hypertension Father         high blood pressure    Hyperlipidemia Father         high blood pressure    Genetic Disorder Maternal Grandmother         Dementia - I am a APOE4 heterozygote    Genetic Disorder Maternal Aunt         Dementia    Genetic Disorder Maternal Uncle         Dementia     Current Outpatient Medications   Medication Sig Dispense Refill    ezetimibe (ZETIA) 10 MG Tab Take 1 Tablet by mouth every day. 30 Tablet 2    alendronate (FOSAMAX) 70 MG Tab TAKE 1 TABLET BY MOUTH WEEKLY 12 Tablet 0    rosuvastatin (CRESTOR) 10 MG Tab Take 1 Tablet by mouth every day. 90 Tablet 3    losartan (COZAAR) 25 MG Tab Take 1 Tablet by mouth every day. 90 Tablet 3    amLODIPine (NORVASC) 10 MG Tab Take 1 Tablet by mouth every day for 180 days. 90 Tablet 1    levothyroxine (SYNTHROID) 112 MCG Tab Take 112 mcg by mouth every morning on an empty stomach.      progesterone (PROMETRIUM) 100 MG Cap Take 100 mg by mouth every day.      estradiol (CLIMARA) 0.05 MG/24HR PATCH WEEKLY Place 1 Patch on the skin every 7 days.       No current facility-administered medications for this visit.       Allergies:  Allergies   Allergen Reactions    Midazolam Unspecified     \"opposite reaction\"    " "Promethazine Unspecified     \"opposite reaction\".       Allergies, past medical history, past surgical history, family history, social history reviewed and updated.    Objective:    BP (!) 144/74 (BP Location: Left arm, Patient Position: Sitting, BP Cuff Size: Adult)   Pulse (!) 101   Temp 36.5 °C (97.7 °F) (Temporal)   Resp 12   Ht 1.6 m (5' 3\")   Wt 70.9 kg (156 lb 4.9 oz)   SpO2 97%   BMI 27.69 kg/m²    Body mass index is 27.69 kg/m².    Physical exam:  General: Normal appearance, no acute distress, not ill-appearing  HEENT: EOM intact, conjunctiva normal limits, negative right/left eye discharge.  Sclera anicteric  Cardiovascular: Normal rate and rhythm, no murmurs  Pulmonary: No respiratory distress, no wheezing, no rales, breath sounds normal.  Musculoskeletal: No edema bilaterally  Skin: Warm, dry, no lesions  Neurological: No focal deficits, normal gait  Psychiatric: Mood within normal limits    Assessment/Plan:    Problem List Items Addressed This Visit       Hypothyroidism     Chronic, stable condition continue levothyroxine 112 mcg daily         Cyst of left ovary     US 8/2023:  Impression      1.  Relatively stable simple appearing left ovarian cyst.   2.  Normal right ovary and uterus.   3.  Consider ongoing surveillance ultrasound follow-up of the cyst in 6-12   months.       Wants to be referred to Gyn Dr. Diaz              Relevant Orders    Referral to Gynecology    Pulmonary nodule 1 cm or greater in diameter     Media in chart, 2 nodules 8 mm and 16 mm that are stable images 1 year apart.  Can consider imaging again in 2 to 3 years.         Primary hypertension     Chronic, stable condition.  Continue losartan 25 mg daily, amlodipine 10 mg daily.         Relevant Medications    ezetimibe (ZETIA) 10 MG Tab    Age-related osteoporosis without current pathological fracture    Relevant Orders    DS-BONE DENSITY STUDY (DEXA)    Stage 2 chronic kidney disease     GFR 36, avoid nephrotoxic " medications.         Simple renal cyst    Hyperlipidemia     ASCVD risk about 1%.  She reports that she is currently on Crestor 10 mg daily but would not like to increase this medication.  Instead she would like to add Zetia 10 mg daily which she heard also helps with the disease process of Alzheimer disease as she is apoe positive         Relevant Medications    ezetimibe (ZETIA) 10 MG Tab    Other Relevant Orders    Referral to Lipid Clinic     Other Visit Diagnoses       APOE*3/*4 genotype        Relevant Medications    ezetimibe (ZETIA) 10 MG Tab    Other Relevant Orders    Referral to Lipid Clinic             Return in about 3 months (around 11/10/2023) for awv.

## 2023-08-14 ENCOUNTER — TELEPHONE (OUTPATIENT)
Dept: VASCULAR LAB | Facility: MEDICAL CENTER | Age: 68
End: 2023-08-14
Payer: MEDICARE

## 2023-08-14 NOTE — TELEPHONE ENCOUNTER
[facility]    Received pharmacotherapy referral for lipids from Dr. Simms on 8/10/23    Called pt to schedule appt to establish care - no answer. LVM.    Insurance: Medicare  PCP: [facility]  Locations to be seen: Any    [facility] at [phone], fax [phone]    Mehul Donaldson, DonovanD, BCACP

## 2023-08-19 ENCOUNTER — OFFICE VISIT (OUTPATIENT)
Dept: URGENT CARE | Facility: CLINIC | Age: 68
End: 2023-08-19
Payer: MEDICARE

## 2023-08-19 VITALS
HEART RATE: 80 BPM | OXYGEN SATURATION: 98 % | WEIGHT: 156 LBS | RESPIRATION RATE: 14 BRPM | SYSTOLIC BLOOD PRESSURE: 116 MMHG | DIASTOLIC BLOOD PRESSURE: 62 MMHG | BODY MASS INDEX: 27.64 KG/M2 | HEIGHT: 63 IN | TEMPERATURE: 98.6 F

## 2023-08-19 DIAGNOSIS — U07.1 COVID: ICD-10-CM

## 2023-08-19 PROBLEM — E87.1 HYPONATREMIA: Status: ACTIVE | Noted: 2019-11-18

## 2023-08-19 PROBLEM — N17.9 ACUTE RENAL FAILURE SYNDROME (HCC): Status: ACTIVE | Noted: 2023-08-19

## 2023-08-19 PROBLEM — E79.0 HYPERURICEMIA WITHOUT SIGNS OF INFLAMMATORY ARTHRITIS AND TOPHACEOUS DISEASE: Status: ACTIVE | Noted: 2023-08-19

## 2023-08-19 PROCEDURE — 3074F SYST BP LT 130 MM HG: CPT

## 2023-08-19 PROCEDURE — 3078F DIAST BP <80 MM HG: CPT

## 2023-08-19 PROCEDURE — 99213 OFFICE O/P EST LOW 20 MIN: CPT

## 2023-08-19 RX ORDER — LOSARTAN POTASSIUM 25 MG/1
TABLET ORAL
COMMUNITY
End: 2023-12-06

## 2023-08-19 RX ORDER — SIMVASTATIN 20 MG
TABLET ORAL
COMMUNITY
End: 2023-12-06

## 2023-08-19 RX ORDER — QUETIAPINE 150 MG/1
1 TABLET, FILM COATED, EXTENDED RELEASE ORAL
COMMUNITY
End: 2023-12-06

## 2023-08-19 RX ORDER — ZIPRASIDONE HYDROCHLORIDE 20 MG/1
CAPSULE ORAL
COMMUNITY
End: 2023-12-06

## 2023-08-19 RX ORDER — ZOSTER VACCINE RECOMBINANT, ADJUVANTED 50 MCG/0.5
KIT INTRAMUSCULAR
Status: ON HOLD | COMMUNITY
End: 2024-02-29

## 2023-08-19 RX ORDER — ESZOPICLONE 3 MG/1
TABLET, FILM COATED ORAL
COMMUNITY
End: 2023-12-06

## 2023-08-19 RX ORDER — ALBUTEROL SULFATE 90 UG/1
2 AEROSOL, METERED RESPIRATORY (INHALATION) EVERY 6 HOURS PRN
Qty: 8.5 G | Refills: 0 | Status: SHIPPED | OUTPATIENT
Start: 2023-08-19 | End: 2023-08-20

## 2023-08-19 RX ORDER — LEVOTHYROXINE SODIUM 0.12 MG/1
1 TABLET ORAL
COMMUNITY
End: 2023-12-06

## 2023-08-19 RX ORDER — TRAZODONE HYDROCHLORIDE 150 MG/1
TABLET ORAL
COMMUNITY
End: 2023-12-06

## 2023-08-19 RX ORDER — PROGESTERONE 100 MG/1
100 CAPSULE ORAL DAILY
COMMUNITY

## 2023-08-19 RX ORDER — CLONIDINE HYDROCHLORIDE 0.1 MG/1
TABLET ORAL
COMMUNITY
End: 2023-12-06

## 2023-08-19 RX ORDER — AMLODIPINE BESYLATE AND ATORVASTATIN CALCIUM 5; 20 MG/1; MG/1
TABLET, FILM COATED ORAL
COMMUNITY
End: 2023-10-31

## 2023-08-19 RX ORDER — LISINOPRIL 10 MG/1
TABLET ORAL
COMMUNITY
End: 2023-12-06

## 2023-08-19 RX ORDER — ALPHA LIPOIC ACID 300 MG
CAPSULE ORAL
COMMUNITY
End: 2023-12-06

## 2023-08-19 RX ORDER — CLONAZEPAM 1 MG/1
TABLET ORAL
COMMUNITY
End: 2023-12-06

## 2023-08-19 RX ORDER — HYDROCHLOROTHIAZIDE 25 MG/1
TABLET ORAL
COMMUNITY
End: 2023-12-06

## 2023-08-19 ASSESSMENT — FIBROSIS 4 INDEX: FIB4 SCORE: 1.15

## 2023-08-19 NOTE — PROGRESS NOTES
Subjective:   Estefani Ho is a 68 y.o. female who presents for Coronavirus Screening (Scratchy throat, sweats x 3 to 4 days, pt states tested + today )      HPI:    Patient presents urgent care with concerns of positive COVID test  She states she tested positive this morning and is holding the positive test on her hand  Reports mild sore throat, rhinorrhea, congestion, dry cough, fatigue, body aches  States she was at a fundraising event last week  Her  has also been sick with similar illness but he tested negative for COVID  Denies wheezing, shortness of breath  Denies fever, chills  Reports decreased appetite, however she is tolerating fluids.  Reports normal urinary output  Mild improvement with rest, Tylenol  Patient is requesting antiviral medication      ROS As above in HPI    Medications:    Current Outpatient Medications on File Prior to Visit   Medication Sig Dispense Refill    Zoster Vac Recomb Adjuvanted (SHINGRIX) 50 MCG/0.5ML Recon Susp       ziprasidone (GEODON) 20 MG Cap       traZODone (DESYREL) 150 MG Tab TAKE 1&1/2 TABLET BY MOUTH AT BEDTIME      simvastatin (ZOCOR) 20 MG Tab Take 1 tablet every day by oral route for 90 days.      QUEtiapine Fumarate (SEROQUEL XR) 150 MG TABLET SR 24 HR Take 1 Tablet by mouth every day.      progesterone (PROMETRIUM) 100 MG Cap 1 capsule at bedtime      losartan (COZAAR) 25 MG Tab 1 tablet      lisinopril (PRINIVIL) 10 MG Tab Take 1 tablet every day by oral route for 90 days.      levothyroxine (SYNTHROID) 125 MCG Tab Take 1 Tablet by mouth every morning on an empty stomach.      hydroCHLOROthiazide (HYDRODIURIL) 25 MG Tab       Eszopiclone 3 MG Tab       cloNIDine (CATAPRES) 0.1 MG Tab TAKE 1 TAB 3 TIMES PER DAY AS NEEDED IF SYSTOLIC  150 OR DIASTOLIC   90      clonazePAM (KLONOPIN) 1 MG Tab       amlodipine-atorvastatatin (CADUET) 5-20 MG per tablet 1 tablet      Alpha-Lipoic Acid 300 MG Cap Take 1 capsule every day by oral route.      ezetimibe  "(ZETIA) 10 MG Tab Take 1 Tablet by mouth every day. 30 Tablet 2    alendronate (FOSAMAX) 70 MG Tab TAKE 1 TABLET BY MOUTH WEEKLY 12 Tablet 0    rosuvastatin (CRESTOR) 10 MG Tab Take 1 Tablet by mouth every day. 90 Tablet 3    losartan (COZAAR) 25 MG Tab Take 1 Tablet by mouth every day. 90 Tablet 3    levothyroxine (SYNTHROID) 112 MCG Tab Take 112 mcg by mouth every morning on an empty stomach.      progesterone (PROMETRIUM) 100 MG Cap Take 100 mg by mouth every day.      estradiol (CLIMARA) 0.05 MG/24HR PATCH WEEKLY Place 1 Patch on the skin every 7 days.       No current facility-administered medications on file prior to visit.        Allergies:   Midazolam and Promethazine    Problem List:   Patient Active Problem List   Diagnosis    Hypothyroidism    Cyst of left ovary    Pulmonary nodule 1 cm or greater in diameter    Primary hypertension    Age-related osteoporosis without current pathological fracture    Stage 2 chronic kidney disease    Atherosclerosis of abdominal aorta (HCC)    Simple renal cyst    Hyperlipidemia    Hyponatremia    Hyperuricemia without signs of inflammatory arthritis and tophaceous disease    Acute renal failure syndrome (HCC)        Surgical History:  Past Surgical History:   Procedure Laterality Date    PRIMARY C SECTION      TUBAL COAGULATION LAPAROSCOPIC BILATERAL         Past Social Hx:   Social History     Tobacco Use    Smoking status: Never    Smokeless tobacco: Never    Tobacco comments:     2 months in college   Vaping Use    Vaping Use: Never used   Substance Use Topics    Alcohol use: Not Currently     Comment: Neuro toxin - I do not drink    Drug use: Never          Problem list, medications, and allergies reviewed by myself today in Epic.     Objective:     /62 (BP Location: Left arm, Patient Position: Sitting, BP Cuff Size: Adult)   Pulse 80   Temp 37 °C (98.6 °F) (Temporal)   Resp 14   Ht 1.588 m (5' 2.5\")   Wt 70.8 kg (156 lb)   SpO2 98%   BMI 28.08 kg/m² "     Physical Exam  Vitals and nursing note reviewed.   Constitutional:       General: She is not in acute distress.     Appearance: Normal appearance. She is not ill-appearing.   HENT:      Head: Normocephalic and atraumatic.      Right Ear: Tympanic membrane and ear canal normal.      Left Ear: Tympanic membrane and ear canal normal.      Nose: Congestion and rhinorrhea present. Rhinorrhea is clear.      Right Sinus: No maxillary sinus tenderness or frontal sinus tenderness.      Left Sinus: No maxillary sinus tenderness or frontal sinus tenderness.      Mouth/Throat:      Mouth: Mucous membranes are moist.      Pharynx: Oropharynx is clear. Uvula midline. Posterior oropharyngeal erythema present. No pharyngeal swelling or oropharyngeal exudate.      Tonsils: No tonsillar exudate. 1+ on the right. 1+ on the left.   Eyes:      General:         Right eye: No discharge.         Left eye: No discharge.   Cardiovascular:      Rate and Rhythm: Normal rate and regular rhythm.      Heart sounds: Normal heart sounds. No murmur heard.     No friction rub. No gallop.   Pulmonary:      Effort: Pulmonary effort is normal. No respiratory distress.      Breath sounds: Normal breath sounds and air entry. No stridor. No decreased breath sounds, wheezing, rhonchi or rales.   Chest:      Chest wall: No tenderness.   Abdominal:      General: Bowel sounds are normal.      Palpations: Abdomen is soft.   Musculoskeletal:      Cervical back: No rigidity or tenderness.   Lymphadenopathy:      Cervical: No cervical adenopathy.   Skin:     General: Skin is warm and dry.      Capillary Refill: Capillary refill takes less than 2 seconds.      Findings: No rash.   Neurological:      Mental Status: She is alert and oriented to person, place, and time.         Assessment/Plan:     Diagnosis and associated orders:   1. COVID  - molnupiravir 200 MG capsule; Take 4 Capsules by mouth 2 times a day for 5 days.  Dispense: 40 Capsule; Refill: 0  -  albuterol 108 (90 Base) MCG/ACT Aero Soln inhalation aerosol; Inhale 2 Puffs every 6 hours as needed for Shortness of Breath.  Dispense: 8.5 g; Refill: 0       Comments/MDM:     Patient is not in acute distress, is hemodynamically stable. She reports her vitals have been stable, denies tachycardia, intractable fever, pulse ox >95%. Symptoms reviewed and patient appears to have mild form of Covid at this time. She would like to have antiviral medications.   Antiviral medication side effects, adverse effects, rebound illness reviewed. Patient verbalized understanding and consented to use.  Reviewed patient's daily medications and she uses Crestor. She has pmh of CKD.   Molnupiravir ordered. Albuterol ordered in case patient develops more SOB while acutely ill.      Supportive measures encouraged: Rest, increased oral hydration, Tylenol as needed per package instructions, warm humidification, otc antihistamines, Flonase,  cough suppressant as needed. Continue to monitor vitals at home.     Strict return to ER precautions reviewed.     Please note that this dictation was created using voice recognition software. I have made a reasonable attempt to correct obvious errors, but I expect that there are errors of grammar and possibly content that I did not discover before finalizing the note.    This note was electronically signed by Fatoumata Carrillo, BINDU, APRN, FNP-C

## 2023-08-20 RX ORDER — ALBUTEROL SULFATE 90 UG/1
2 AEROSOL, METERED RESPIRATORY (INHALATION) EVERY 6 HOURS PRN
Qty: 8.5 G | Refills: 0 | Status: SHIPPED | OUTPATIENT
Start: 2023-08-20 | End: 2023-12-06

## 2023-08-21 ENCOUNTER — TELEPHONE (OUTPATIENT)
Dept: VASCULAR LAB | Facility: MEDICAL CENTER | Age: 68
End: 2023-08-21
Payer: MEDICARE

## 2023-08-21 NOTE — TELEPHONE ENCOUNTER
[facility]     Received pharmacotherapy referral for lipids from Dr. Simms on 8/10/23     Called pt to schedule appt to establish care - no answer. LVM.    2nd call.     Insurance: Medicare  PCP: [facility]  Locations to be seen: Any     [facility] Anticoagulation/Pharmacotherapy Clinic at [phone], fax [phone]     Mehul Donaldson, DonovanD, BCACP

## 2023-08-21 NOTE — LETTER
[address]  [facility] [address]        Dear Estefani Ho ,    We have been unsuccessful in our attempts to contact you regarding your lipid Clinical Pharmacist referral.  Please contact us if you would like to schedule an appointment for help managing your cholesterol.    Please contact our clinic so we may assist you.  We are open Monday-Friday 8 am until 5 pm.  You may reach our Service at [phone].        Sincerely,    Alphonso Love, DonovanD, [facility]  Clinic Supervisor  [facility]  Outpatient Anticoagulation Service

## 2023-08-25 ENCOUNTER — OFFICE VISIT (OUTPATIENT)
Dept: URGENT CARE | Facility: CLINIC | Age: 68
End: 2023-08-25
Payer: MEDICARE

## 2023-08-25 VITALS
HEIGHT: 63 IN | WEIGHT: 156 LBS | SYSTOLIC BLOOD PRESSURE: 126 MMHG | HEART RATE: 77 BPM | RESPIRATION RATE: 14 BRPM | TEMPERATURE: 98.4 F | BODY MASS INDEX: 27.64 KG/M2 | DIASTOLIC BLOOD PRESSURE: 62 MMHG | OXYGEN SATURATION: 93 %

## 2023-08-25 DIAGNOSIS — Z86.16 HISTORY OF COVID-19: ICD-10-CM

## 2023-08-25 DIAGNOSIS — U07.1 COVID-19 VIRUS INFECTION: Primary | ICD-10-CM

## 2023-08-25 LAB
FLUAV RNA SPEC QL NAA+PROBE: NEGATIVE
FLUBV RNA SPEC QL NAA+PROBE: NEGATIVE
RSV RNA SPEC QL NAA+PROBE: NEGATIVE
SARS-COV-2 RNA RESP QL NAA+PROBE: POSITIVE

## 2023-08-25 PROCEDURE — 99214 OFFICE O/P EST MOD 30 MIN: CPT | Performed by: NURSE PRACTITIONER

## 2023-08-25 PROCEDURE — 3074F SYST BP LT 130 MM HG: CPT | Performed by: NURSE PRACTITIONER

## 2023-08-25 PROCEDURE — 0241U POCT CEPHEID COV-2, FLU A/B, RSV - PCR: CPT | Performed by: NURSE PRACTITIONER

## 2023-08-25 PROCEDURE — 3078F DIAST BP <80 MM HG: CPT | Performed by: NURSE PRACTITIONER

## 2023-08-25 ASSESSMENT — FIBROSIS 4 INDEX: FIB4 SCORE: 1.15

## 2023-08-25 NOTE — PROGRESS NOTES
"Estefani Ho is a 68 y.o. female who presents for Coronavirus Screening (Pt states was last seen 8/19/23, pt requesting signature of paperwork for a reimbursement )      HPI  This is a new problem. Estefani Ho is a 68 y.o. patient who presents to urgent care with c/o: 08/15/23 symptoms started.  She did a home COVID test that tested positive for COVID infection.  She was seen on August 19 2023 in urgent care.  She was given molnupiravir and an albuterol inhaler.  Her symptoms have improved but she still has a cough and feels very fatigued.  The first few days of her illness she was so sick she says she could not get out of bed.  She is scheduled to go on motify.  She is here today requesting documentation of her illness for her insurance.        ROS See HPI    Allergies:       Allergies   Allergen Reactions    Midazolam Unspecified     \"opposite reaction\"    Promethazine Unspecified     \"opposite reaction\".       PMSFS Hx:  Past Medical History:   Diagnosis Date    Hyperlipidemia     Hypertension     Kidney disease     Thyroid disease      Past Surgical History:   Procedure Laterality Date    PRIMARY C SECTION      TUBAL COAGULATION LAPAROSCOPIC BILATERAL       Family History   Problem Relation Age of Onset    Cancer Other         breast    Heart Disease Mother         heart failure    Hypertension Father         high blood pressure    Hyperlipidemia Father         high blood pressure    Genetic Disorder Maternal Grandmother         Dementia - I am a APOE4 heterozygote    Genetic Disorder Maternal Aunt         Dementia    Genetic Disorder Maternal Uncle         Dementia     Social History     Tobacco Use    Smoking status: Never    Smokeless tobacco: Never    Tobacco comments:     2 months in college   Substance Use Topics    Alcohol use: Not Currently     Comment: Neuro toxin - I do not drink       Problems:   Patient Active Problem List   Diagnosis    Hypothyroidism    Cyst of left ovary    " Pulmonary nodule 1 cm or greater in diameter    Primary hypertension    Age-related osteoporosis without current pathological fracture    Stage 2 chronic kidney disease    Atherosclerosis of abdominal aorta (HCC)    Simple renal cyst    Hyperlipidemia    Hyponatremia    Hyperuricemia without signs of inflammatory arthritis and tophaceous disease    Acute renal failure syndrome (HCC)       Medications:   Current Outpatient Medications on File Prior to Visit   Medication Sig Dispense Refill    albuterol 108 (90 Base) MCG/ACT Aero Soln inhalation aerosol Inhale 2 Puffs every 6 hours as needed for Shortness of Breath. 8.5 g 0    molnupiravir 200 MG capsule Take 4 Capsules by mouth 2 times a day for 5 days. 40 Capsule 0    Zoster Vac Recomb Adjuvanted (SHINGRIX) 50 MCG/0.5ML Recon Susp       ziprasidone (GEODON) 20 MG Cap       traZODone (DESYREL) 150 MG Tab TAKE 1&1/2 TABLET BY MOUTH AT BEDTIME      simvastatin (ZOCOR) 20 MG Tab Take 1 tablet every day by oral route for 90 days.      QUEtiapine Fumarate (SEROQUEL XR) 150 MG TABLET SR 24 HR Take 1 Tablet by mouth every day.      progesterone (PROMETRIUM) 100 MG Cap 1 capsule at bedtime      losartan (COZAAR) 25 MG Tab 1 tablet      lisinopril (PRINIVIL) 10 MG Tab Take 1 tablet every day by oral route for 90 days.      levothyroxine (SYNTHROID) 125 MCG Tab Take 1 Tablet by mouth every morning on an empty stomach.      hydroCHLOROthiazide (HYDRODIURIL) 25 MG Tab       Eszopiclone 3 MG Tab       cloNIDine (CATAPRES) 0.1 MG Tab TAKE 1 TAB 3 TIMES PER DAY AS NEEDED IF SYSTOLIC  150 OR DIASTOLIC   90      clonazePAM (KLONOPIN) 1 MG Tab       amlodipine-atorvastatatin (CADUET) 5-20 MG per tablet 1 tablet      Alpha-Lipoic Acid 300 MG Cap Take 1 capsule every day by oral route.      ezetimibe (ZETIA) 10 MG Tab Take 1 Tablet by mouth every day. 30 Tablet 2    rosuvastatin (CRESTOR) 10 MG Tab Take 1 Tablet by mouth every day. 90 Tablet 3    losartan (COZAAR) 25 MG Tab Take 1  "Tablet by mouth every day. 90 Tablet 3    levothyroxine (SYNTHROID) 112 MCG Tab Take 112 mcg by mouth every morning on an empty stomach.      progesterone (PROMETRIUM) 100 MG Cap Take 100 mg by mouth every day.      estradiol (CLIMARA) 0.05 MG/24HR PATCH WEEKLY Place 1 Patch on the skin every 7 days.       No current facility-administered medications on file prior to visit.          Objective:     /62 (BP Location: Left arm, Patient Position: Sitting, BP Cuff Size: Adult)   Pulse 77   Temp 36.9 °C (98.4 °F) (Temporal)   Resp 14   Ht 1.588 m (5' 2.5\")   Wt 70.8 kg (156 lb)   SpO2 93%   BMI 28.08 kg/m²     Physical Exam  Vitals and nursing note reviewed.   Constitutional:       General: She is not in acute distress.     Appearance: Normal appearance. She is normal weight. She is not ill-appearing.   Eyes:      Conjunctiva/sclera: Conjunctivae normal.   Cardiovascular:      Rate and Rhythm: Normal rate and regular rhythm.      Pulses: Normal pulses.      Heart sounds: Normal heart sounds.   Pulmonary:      Effort: Pulmonary effort is normal.      Breath sounds: Normal breath sounds.   Skin:     General: Skin is warm and dry.      Capillary Refill: Capillary refill takes less than 2 seconds.   Neurological:      Mental Status: She is alert and oriented to person, place, and time.   Psychiatric:         Mood and Affect: Mood normal.         Behavior: Behavior normal.         Thought Content: Thought content normal.       Results for orders placed or performed in visit on 08/25/23   POCT CEPHEID COV-2, FLU A/B, RSV - PCR   Result Value Ref Range    SARS-CoV-2 by PCR Positive (A) Negative, Invalid    Influenza virus A RNA Negative Negative, Invalid    Influenza virus B, PCR Negative Negative, Invalid    RSV, PCR Negative Negative, Invalid         Assessment /Associated Orders:      1. COVID-19 virus infection        2. History of COVID-19  POCT CEPHEID COV-2, FLU A/B, RSV - PCR            Medical Decision " Making:      Pt is clinically stable at today's acute urgent care visit.  No acute distress noted.  VSS. Appropriate for outpatient care at this time.   Acute problem today with uncertain prognosis.     Physician statement insurance papers completed for pt today.     Increase hydration and rest.   Discussed Dx, management options (risks,benefits, and alternatives to planned treatment), natural progression and supportive care.  Expressed understanding and the treatment plan was agreed upon.   Questions were encouraged and answered   Return to urgent care prn if new or worsening sx or if there is no improvement in condition prn.    Educated in Red flags and indications to immediately call 911 or present to the Emergency Department.       Time I spent evaluating Estefani Ho in urgent care today was 32  minutes. This time includes preparing for visit, reviewing any pertinent notes or test results, counseling/education, exam, obtaining HPI, interpretation of lab tests, medication management and documentation as indicated above.Time does not include separately billable procedures noted .       Please note that this dictation was created using voice recognition software. I have worked with consultants from the vendor as well as technical experts from [facility] [employer] to optimize the interface. I have made every reasonable attempt to correct obvious errors, but I expect that there are errors of grammar and possibly content that I did not discover before finalizing the note.  This note was electronically signed by provider

## 2023-08-28 ENCOUNTER — TELEPHONE (OUTPATIENT)
Dept: VASCULAR LAB | Facility: MEDICAL CENTER | Age: 68
End: 2023-08-28
Payer: MEDICARE

## 2023-08-28 NOTE — TELEPHONE ENCOUNTER
[facility]     Received pharmacotherapy referral for lipids from Dr. Simms on 8/10/23     Called pt to schedule appt to establish care - pt states she prefers to see Dr. Bloch or Dr. Larson and NOT the pharmacist.     3rd call.     Insurance: Medicare  [facility]  Locations to be seen: Any     [facility] Anticoagulation/Pharmacotherapy Clinic at [phone], fax [phone]     Mehul Donaldson, DonovanD, BCACP

## 2023-08-29 ENCOUNTER — DOCUMENTATION (OUTPATIENT)
Dept: VASCULAR LAB | Facility: MEDICAL CENTER | Age: 68
End: 2023-08-29
Payer: MEDICARE

## 2023-09-01 RX ORDER — ALENDRONATE SODIUM 70 MG/1
TABLET ORAL
Qty: 12 TABLET | Refills: 0 | Status: SHIPPED | OUTPATIENT
Start: 2023-09-01 | End: 2023-11-15

## 2023-09-01 NOTE — TELEPHONE ENCOUNTER
Received request via: Pharmacy    Was the patient seen in the last year in this department? Yes    Does the patient have an active prescription (recently filled or refills available) for medication(s) requested? No    Does the patient have detention Plus and need 100 day supply (blood pressure, diabetes and cholesterol meds only)? Patient does not have SCP

## 2023-09-05 ENCOUNTER — DOCUMENTATION (OUTPATIENT)
Dept: VASCULAR LAB | Facility: MEDICAL CENTER | Age: 68
End: 2023-09-05
Payer: MEDICARE

## 2023-09-05 NOTE — PROGRESS NOTES
Called patient to schedule with MD for new patient referral. Patient declines services with our clinic at this time.       Mariel Clifton, Medical Assistant   [facility]   Ph: [phone]  Fx: [phone]

## 2023-10-19 ENCOUNTER — APPOINTMENT (RX ONLY)
Dept: URBAN - METROPOLITAN AREA CLINIC 31 | Facility: CLINIC | Age: 68
Setting detail: DERMATOLOGY
End: 2023-10-19

## 2023-10-19 DIAGNOSIS — L82.1 OTHER SEBORRHEIC KERATOSIS: ICD-10-CM

## 2023-10-19 DIAGNOSIS — Z71.89 OTHER SPECIFIED COUNSELING: ICD-10-CM

## 2023-10-19 DIAGNOSIS — L81.4 OTHER MELANIN HYPERPIGMENTATION: ICD-10-CM

## 2023-10-19 DIAGNOSIS — D22 MELANOCYTIC NEVI: ICD-10-CM

## 2023-10-19 DIAGNOSIS — D18.0 HEMANGIOMA: ICD-10-CM

## 2023-10-19 PROBLEM — D18.01 HEMANGIOMA OF SKIN AND SUBCUTANEOUS TISSUE: Status: ACTIVE | Noted: 2023-10-19

## 2023-10-19 PROBLEM — D22.5 MELANOCYTIC NEVI OF TRUNK: Status: ACTIVE | Noted: 2023-10-19

## 2023-10-19 PROCEDURE — ? ADDITIONAL NOTES

## 2023-10-19 PROCEDURE — 99203 OFFICE O/P NEW LOW 30 MIN: CPT

## 2023-10-19 PROCEDURE — ? COUNSELING

## 2023-10-19 ASSESSMENT — LOCATION DETAILED DESCRIPTION DERM
LOCATION DETAILED: RIGHT INFERIOR UPPER BACK
LOCATION DETAILED: RIGHT SUPERIOR UPPER BACK
LOCATION DETAILED: RIGHT PROXIMAL LATERAL PRETIBIAL REGION
LOCATION DETAILED: RIGHT PROXIMAL PRETIBIAL REGION

## 2023-10-19 ASSESSMENT — LOCATION ZONE DERM
LOCATION ZONE: LEG
LOCATION ZONE: TRUNK

## 2023-10-19 ASSESSMENT — LOCATION SIMPLE DESCRIPTION DERM
LOCATION SIMPLE: RIGHT LOWER LEG
LOCATION SIMPLE: RIGHT UPPER BACK
LOCATION SIMPLE: RIGHT PRETIBIAL REGION

## 2023-10-19 NOTE — PROCEDURE: MIPS QUALITY
Quality 226: Preventive Care And Screening: Tobacco Use: Screening And Cessation Intervention: Patient screened for tobacco use and is an ex/non-smoker
Quality 130: Documentation Of Current Medications In The Medical Record: Current Medications Documented
Quality 111:Pneumonia Vaccination Status For Older Adults: Patient received any pneumococcal conjugate or polysaccharide vaccine on or after their 60th birthday and before the end of the measurement period
Quality 431: Preventive Care And Screening: Unhealthy Alcohol Use - Screening: Patient not identified as an unhealthy alcohol user when screened for unhealthy alcohol use using a systematic screening method
Quality 110: Preventive Care And Screening: Influenza Immunization: Influenza Immunization Administered during Influenza season
Detail Level: Detailed

## 2023-10-19 NOTE — PROCEDURE: ADDITIONAL NOTES
Additional Notes: The patient could not be educated to avoid sun or wear sunblock.
Detail Level: Zone
Render Risk Assessment In Note?: no

## 2023-10-31 DIAGNOSIS — I10 PRIMARY HYPERTENSION: ICD-10-CM

## 2023-10-31 RX ORDER — AMLODIPINE BESYLATE 10 MG/1
10 TABLET ORAL DAILY
Qty: 90 TABLET | Refills: 1 | Status: SHIPPED | OUTPATIENT
Start: 2023-10-31 | End: 2024-04-28

## 2023-10-31 NOTE — TELEPHONE ENCOUNTER
Pt is about to be out of meds and needs a refill of her BP medication but her PCP is out of office  Order has been pended.

## 2023-11-06 DIAGNOSIS — Z15.89 APOE*3/*4 GENOTYPE: ICD-10-CM

## 2023-11-06 DIAGNOSIS — E78.2 MIXED HYPERLIPIDEMIA: ICD-10-CM

## 2023-11-07 RX ORDER — EZETIMIBE 10 MG/1
10 TABLET ORAL DAILY
Qty: 30 TABLET | Refills: 0 | Status: SHIPPED | OUTPATIENT
Start: 2023-11-07 | End: 2024-01-05

## 2023-11-15 RX ORDER — ALENDRONATE SODIUM 70 MG/1
TABLET ORAL
Qty: 12 TABLET | Refills: 0 | Status: SHIPPED | OUTPATIENT
Start: 2023-11-15 | End: 2024-02-14

## 2023-12-06 ENCOUNTER — OFFICE VISIT (OUTPATIENT)
Dept: MEDICAL GROUP | Facility: PHYSICIAN GROUP | Age: 68
End: 2023-12-06
Payer: MEDICARE

## 2023-12-06 VITALS
RESPIRATION RATE: 14 BRPM | WEIGHT: 157.85 LBS | HEART RATE: 87 BPM | SYSTOLIC BLOOD PRESSURE: 120 MMHG | TEMPERATURE: 97.3 F | HEIGHT: 63 IN | OXYGEN SATURATION: 96 % | DIASTOLIC BLOOD PRESSURE: 76 MMHG | BODY MASS INDEX: 27.97 KG/M2

## 2023-12-06 DIAGNOSIS — N18.2 STAGE 2 CHRONIC KIDNEY DISEASE: ICD-10-CM

## 2023-12-06 DIAGNOSIS — M81.0 AGE-RELATED OSTEOPOROSIS WITHOUT CURRENT PATHOLOGICAL FRACTURE: ICD-10-CM

## 2023-12-06 DIAGNOSIS — I10 PRIMARY HYPERTENSION: ICD-10-CM

## 2023-12-06 DIAGNOSIS — N83.202 CYST OF LEFT OVARY: ICD-10-CM

## 2023-12-06 DIAGNOSIS — M72.2 PLANTAR FASCIITIS, LEFT: ICD-10-CM

## 2023-12-06 DIAGNOSIS — E78.2 MIXED HYPERLIPIDEMIA: ICD-10-CM

## 2023-12-06 DIAGNOSIS — Z11.59 NEED FOR HEPATITIS C SCREENING TEST: ICD-10-CM

## 2023-12-06 DIAGNOSIS — Z00.00 HEALTHCARE MAINTENANCE: ICD-10-CM

## 2023-12-06 DIAGNOSIS — E03.9 HYPOTHYROIDISM, UNSPECIFIED TYPE: ICD-10-CM

## 2023-12-06 DIAGNOSIS — E78.5 HYPERLIPIDEMIA, UNSPECIFIED HYPERLIPIDEMIA TYPE: ICD-10-CM

## 2023-12-06 DIAGNOSIS — E55.9 VITAMIN D DEFICIENCY: ICD-10-CM

## 2023-12-06 PROBLEM — N17.9 ACUTE RENAL FAILURE SYNDROME (HCC): Status: RESOLVED | Noted: 2023-08-19 | Resolved: 2023-12-06

## 2023-12-06 PROBLEM — E87.1 HYPONATREMIA: Status: RESOLVED | Noted: 2019-11-18 | Resolved: 2023-12-06

## 2023-12-06 PROCEDURE — 3078F DIAST BP <80 MM HG: CPT | Performed by: STUDENT IN AN ORGANIZED HEALTH CARE EDUCATION/TRAINING PROGRAM

## 2023-12-06 PROCEDURE — 3074F SYST BP LT 130 MM HG: CPT | Performed by: STUDENT IN AN ORGANIZED HEALTH CARE EDUCATION/TRAINING PROGRAM

## 2023-12-06 PROCEDURE — 99213 OFFICE O/P EST LOW 20 MIN: CPT | Performed by: STUDENT IN AN ORGANIZED HEALTH CARE EDUCATION/TRAINING PROGRAM

## 2023-12-06 RX ORDER — MIRTAZAPINE 7.5 MG/1
7.5 TABLET, FILM COATED ORAL NIGHTLY
COMMUNITY
Start: 2023-11-08

## 2023-12-06 ASSESSMENT — FIBROSIS 4 INDEX: FIB4 SCORE: 1.15

## 2023-12-06 NOTE — LETTER
[email or web address]  Rodríguez Simms D.O.  [address]  [address]  [place] [address]  Fax: [phone]   Authorization for Release/Disclosure of   Protected Health Information   Name: BENOIT HO : 1955 SSN: [id number]   Address: [address] [address] Phone:    [phone] (home) [phone] (work)   I authorize the entity listed below to release/disclose the PHI below to:   [email or web address]/Rodríguez Simms D.O. and Rodríguez Simms D.O.   Provider or Entity Name:  [facility]   Address   City, [facility]   Phone:      Fax:     Reason for request: continuity of care   Information to be released:    [  x] LAST COLONOSCOPY,  including any PATH REPORT and follow-up  [  ] LAST FIT/COLOGUARD RESULT [  ] LAST DEXA  [  ] LAST MAMMOGRAM  [  ] LAST PAP  [  ] LAST LABS [  ] RETINA EXAM REPORT  [  ] IMMUNIZATION RECORDS  [  ] Release all info      [  ] Check here and initial the line next to each item to release ALL health information INCLUDING  _____ Care and treatment for drug and / or alcohol abuse  _____ HIV testing, infection status, or AIDS  _____ Genetic Testing    DATES OF SERVICE OR TIME PERIOD TO BE DISCLOSED: _____________  I understand and acknowledge that:  * This Authorization may be revoked at any time by you in writing, except if your health information has already been used or disclosed.  * Your health information that will be used or disclosed as a result of you signing this authorization could be re-disclosed by the recipient. If this occurs, your re-disclosed health information may no longer be protected by State or Federal laws.  * You may refuse to sign this Authorization. Your refusal will not affect your ability to obtain treatment.  * This Authorization becomes effective upon signing and will  on (date) __________.      If no date is indicated, this Authorization will  one (1) year from the signature date.    Name: Benoit Ho  Signature:  Date:   12/6/2023     PLEASE FAX REQUESTED RECORDS BACK TO: [phone]

## 2023-12-07 PROBLEM — Z00.00 HEALTHCARE MAINTENANCE: Status: ACTIVE | Noted: 2023-12-07

## 2023-12-07 ASSESSMENT — ENCOUNTER SYMPTOMS
ORTHOPNEA: 0
WHEEZING: 0
CHILLS: 0
SHORTNESS OF BREATH: 0
COUGH: 0
DIZZINESS: 0
PALPITATIONS: 0
FOCAL WEAKNESS: 0
HEADACHES: 0
FEVER: 0

## 2023-12-07 NOTE — PROGRESS NOTES
HISTORY OF PRESENT ILLNESS: Estefani is a pleasant 68 y.o. female, established patient who presents today to discuss medical problems as listed below:    # Hypothyroidism  # Stage II CKD  # Osteoporosis  # Hypertension.  Patient here requesting labs done today.  She would like to check her thyroid function and kidney function.  She denies any symptoms and feels well today.    Problem   Healthcare Maintenance   Hypothyroidism   Cyst of Left Ovary   Stage 2 Chronic Kidney Disease   Acute Renal Failure Syndrome (Hcc) (Resolved)    N17.8 - Other acute kidney failure     Hyponatremia (Resolved)        Current Outpatient Medications Ordered in Epic   Medication Sig Dispense Refill    mirtazapine (REMERON) 7.5 MG tablet       alendronate (FOSAMAX) 70 MG Tab TAKE 1 TABLET BY MOUTH ONCE WEEKLY 12 Tablet 0    ezetimibe (ZETIA) 10 MG Tab TAKE ONE TABLET BY MOUTH ONE TIME DAILY 30 Tablet 0    amLODIPine (NORVASC) 10 MG Tab Take 1 Tablet by mouth every day for 180 days. 90 Tablet 1    Zoster Vac Recomb Adjuvanted (SHINGRIX) 50 MCG/0.5ML Recon Susp       progesterone (PROMETRIUM) 100 MG Cap 1 capsule at bedtime      rosuvastatin (CRESTOR) 10 MG Tab Take 1 Tablet by mouth every day. 90 Tablet 3    losartan (COZAAR) 25 MG Tab Take 1 Tablet by mouth every day. 90 Tablet 3    levothyroxine (SYNTHROID) 112 MCG Tab Take 112 mcg by mouth every morning on an empty stomach.      estradiol (CLIMARA) 0.05 MG/24HR PATCH WEEKLY Place 1 Patch on the skin every 7 days.       No current [facility]-ordered facility-administered medications on file.       Review of systems:  Review of Systems   Constitutional:  Negative for chills and fever.   Respiratory:  Negative for cough, shortness of breath and wheezing.    Cardiovascular:  Negative for chest pain, palpitations, orthopnea and leg swelling.   Musculoskeletal:  Negative for joint pain.   Neurological:  Negative for dizziness, focal weakness and headaches.         Patient Active Problem List     Diagnosis Date Noted    Healthcare maintenance 12/07/2023    Simple renal cyst 08/10/2023    Hyperlipidemia     Atherosclerosis of abdominal aorta (HCC) 05/31/2023    Hypothyroidism 01/30/2023    Cyst of left ovary 01/30/2023    Pulmonary nodule 1 cm or greater in diameter 01/30/2023    Primary hypertension 01/30/2023    Age-related osteoporosis without current pathological fracture 01/30/2023    Stage 2 chronic kidney disease 01/30/2023     Past Surgical History:   Procedure Laterality Date    PRIMARY C SECTION      TUBAL COAGULATION LAPAROSCOPIC BILATERAL       Social History     Tobacco Use    Smoking status: Never    Smokeless tobacco: Never    Tobacco comments:     2 months in college   Vaping Use    Vaping Use: Never used   Substance Use Topics    Alcohol use: Not Currently     Comment: Neuro toxin - I do not drink    Drug use: Never      Family History   Problem Relation Age of Onset    Cancer Other         breast    Heart Disease Mother         heart failure    Hypertension Father         high blood pressure    Hyperlipidemia Father         high blood pressure    Genetic Disorder Maternal Grandmother         Dementia - I am a APOE4 heterozygote    Genetic Disorder Maternal Aunt         Dementia    Genetic Disorder Maternal Uncle         Dementia     Current Outpatient Medications   Medication Sig Dispense Refill    mirtazapine (REMERON) 7.5 MG tablet       alendronate (FOSAMAX) 70 MG Tab TAKE 1 TABLET BY MOUTH ONCE WEEKLY 12 Tablet 0    ezetimibe (ZETIA) 10 MG Tab TAKE ONE TABLET BY MOUTH ONE TIME DAILY 30 Tablet 0    amLODIPine (NORVASC) 10 MG Tab Take 1 Tablet by mouth every day for 180 days. 90 Tablet 1    Zoster Vac Recomb Adjuvanted (SHINGRIX) 50 MCG/0.5ML Recon Susp       progesterone (PROMETRIUM) 100 MG Cap 1 capsule at bedtime      rosuvastatin (CRESTOR) 10 MG Tab Take 1 Tablet by mouth every day. 90 Tablet 3    losartan (COZAAR) 25 MG Tab Take 1 Tablet by mouth every day. 90 Tablet 3     "levothyroxine (SYNTHROID) 112 MCG Tab Take 112 mcg by mouth every morning on an empty stomach.      estradiol (CLIMARA) 0.05 MG/24HR PATCH WEEKLY Place 1 Patch on the skin every 7 days.       No current facility-administered medications for this visit.       Allergies:  Allergies   Allergen Reactions    Midazolam Unspecified     \"opposite reaction\"    Promethazine Unspecified     \"opposite reaction\".       Allergies, past medical history, past surgical history, family history, social history reviewed and updated.    Objective:    /76 (BP Location: Right arm, Patient Position: Sitting, BP Cuff Size: Adult)   Pulse 87   Temp 36.3 °C (97.3 °F) (Temporal)   Resp 14   Ht 1.6 m (5' 3\")   Wt 71.6 kg (157 lb 13.6 oz)   SpO2 96%   BMI 27.96 kg/m²    Body mass index is 27.96 kg/m².    Physical exam:  General: Normal appearance, no acute distress, not ill-appearing  HEENT: EOM intact, conjunctiva normal limits, negative right/left eye discharge.  Sclera anicteric  Cardiovascular: Normal rate and rhythm, no murmurs  Pulmonary: No respiratory distress, no wheezing, no rales, breath sounds normal.    Musculoskeletal: No edema bilaterally  Skin: Warm, dry, no lesions  Neurological: No focal deficits, normal gait  Psychiatric: Mood within normal limits        Assessment/Plan:    Problem List Items Addressed This Visit       Hypothyroidism     Chronic, stable condition.  Continue levothyroxine 112 mcg daily.  Repeat TSH, free T4         Relevant Orders    TSH+FREE T4    Cyst of left ovary     Following with OB GYN         Primary hypertension    Age-related osteoporosis without current pathological fracture    Relevant Orders    VITAMIN D,25 HYDROXY (DEFICIENCY)    Stage 2 chronic kidney disease     Chronic, stable condition.  Continue to avoid nephrotoxic medications.         Relevant Orders    Comp Metabolic Panel    Hyperlipidemia    Relevant Orders    LIPID PANEL    Healthcare maintenance     Records requested for " colonoscopy          Other Visit Diagnoses       Vitamin D deficiency        Relevant Orders    VITAMIN D,25 HYDROXY (DEFICIENCY)    Plantar fasciitis, left        Relevant Orders    Referral to Podiatry    Need for hepatitis C screening test        Relevant Orders    HEP C VIRUS ANTIBODY             Return in about 6 months (around 6/6/2024) for AWV.

## 2023-12-28 ENCOUNTER — TELEPHONE (OUTPATIENT)
Dept: INTERNAL MEDICINE | Facility: IMAGING CENTER | Age: 68
End: 2023-12-28
Payer: MEDICARE

## 2023-12-28 DIAGNOSIS — R19.5 POSITIVE COLORECTAL CANCER SCREENING USING COLOGUARD TEST: ICD-10-CM

## 2024-01-05 DIAGNOSIS — E78.2 MIXED HYPERLIPIDEMIA: ICD-10-CM

## 2024-01-05 DIAGNOSIS — Z15.89 APOE*3/*4 GENOTYPE: ICD-10-CM

## 2024-01-05 RX ORDER — EZETIMIBE 10 MG/1
10 TABLET ORAL DAILY
Qty: 90 TABLET | Refills: 1 | Status: SHIPPED | OUTPATIENT
Start: 2024-01-05

## 2024-02-09 ENCOUNTER — HOSPITAL ENCOUNTER (OUTPATIENT)
Dept: RADIOLOGY | Facility: MEDICAL CENTER | Age: 69
End: 2024-02-09
Attending: STUDENT IN AN ORGANIZED HEALTH CARE EDUCATION/TRAINING PROGRAM
Payer: MEDICARE

## 2024-02-09 DIAGNOSIS — M81.0 AGE-RELATED OSTEOPOROSIS WITHOUT CURRENT PATHOLOGICAL FRACTURE: ICD-10-CM

## 2024-02-09 PROCEDURE — 77080 DXA BONE DENSITY AXIAL: CPT

## 2024-02-09 RX ORDER — LEVOTHYROXINE SODIUM 112 UG/1
112 TABLET ORAL
Qty: 100 TABLET | Refills: 2 | Status: SHIPPED | OUTPATIENT
Start: 2024-02-09

## 2024-02-14 RX ORDER — ALENDRONATE SODIUM 70 MG/1
TABLET ORAL
Qty: 12 TABLET | Refills: 0 | Status: SHIPPED | OUTPATIENT
Start: 2024-02-14

## 2024-02-14 NOTE — TELEPHONE ENCOUNTER
Received request via: Patient    Was the patient seen in the last year in this department? Yes    Does the patient have an active prescription (recently filled or refills available) for medication(s) requested? No    [facility]    Does the patient have custodial Plus and need 100 day supply (blood pressure, diabetes and cholesterol meds only)? Patient does not have SCP

## 2024-02-21 ENCOUNTER — APPOINTMENT (OUTPATIENT)
Dept: MEDICAL GROUP | Facility: PHYSICIAN GROUP | Age: 69
End: 2024-02-21
Payer: MEDICARE

## 2024-02-21 ENCOUNTER — OFFICE VISIT (OUTPATIENT)
Dept: MEDICAL GROUP | Facility: PHYSICIAN GROUP | Age: 69
End: 2024-02-21
Payer: MEDICARE

## 2024-02-21 VITALS
TEMPERATURE: 96.3 F | OXYGEN SATURATION: 96 % | SYSTOLIC BLOOD PRESSURE: 106 MMHG | BODY MASS INDEX: 27.81 KG/M2 | WEIGHT: 156.97 LBS | DIASTOLIC BLOOD PRESSURE: 62 MMHG | HEIGHT: 63 IN | HEART RATE: 79 BPM | RESPIRATION RATE: 14 BRPM

## 2024-02-21 DIAGNOSIS — Z15.89 APOE*3/*4 GENOTYPE: Primary | ICD-10-CM

## 2024-02-21 DIAGNOSIS — N39.41 URGE INCONTINENCE OF URINE: ICD-10-CM

## 2024-02-21 DIAGNOSIS — Z82.0 FAMILY HISTORY OF ALZHEIMER'S DISEASE: ICD-10-CM

## 2024-02-21 DIAGNOSIS — H61.23 BILATERAL IMPACTED CERUMEN: ICD-10-CM

## 2024-02-21 PROCEDURE — G0439 PPPS, SUBSEQ VISIT: HCPCS | Mod: 25 | Performed by: STUDENT IN AN ORGANIZED HEALTH CARE EDUCATION/TRAINING PROGRAM

## 2024-02-21 PROCEDURE — 3074F SYST BP LT 130 MM HG: CPT | Performed by: STUDENT IN AN ORGANIZED HEALTH CARE EDUCATION/TRAINING PROGRAM

## 2024-02-21 PROCEDURE — 69210 REMOVE IMPACTED EAR WAX UNI: CPT | Performed by: STUDENT IN AN ORGANIZED HEALTH CARE EDUCATION/TRAINING PROGRAM

## 2024-02-21 PROCEDURE — 3078F DIAST BP <80 MM HG: CPT | Performed by: STUDENT IN AN ORGANIZED HEALTH CARE EDUCATION/TRAINING PROGRAM

## 2024-02-21 RX ORDER — QUETIAPINE FUMARATE 300 MG/1
TABLET, FILM COATED ORAL
COMMUNITY
End: 2024-02-21

## 2024-02-21 ASSESSMENT — PATIENT HEALTH QUESTIONNAIRE - PHQ9: CLINICAL INTERPRETATION OF PHQ2 SCORE: 0

## 2024-02-21 ASSESSMENT — FIBROSIS 4 INDEX: FIB4 SCORE: 1.16

## 2024-02-21 ASSESSMENT — ENCOUNTER SYMPTOMS: GENERAL WELL-BEING: EXCELLENT

## 2024-02-21 ASSESSMENT — ACTIVITIES OF DAILY LIVING (ADL): BATHING_REQUIRES_ASSISTANCE: 0

## 2024-02-21 NOTE — PROGRESS NOTES
Chief Complaint   Patient presents with    Annual Exam     AWV       HPI:  Estefani Ho is a 69 y.o. here for Medicare Annual Wellness Visit     Patient Active Problem List    Diagnosis Date Noted    Urge incontinence of urine 02/21/2024    Bilateral impacted cerumen 02/21/2024    APOE*3/*4 genotype 02/21/2024    Positive colorectal cancer screening using Cologuard test 12/28/2023    Healthcare maintenance 12/07/2023    Simple renal cyst 08/10/2023    Hyperlipidemia     Atherosclerosis of abdominal aorta (HCC) 05/31/2023    Hypothyroidism 01/30/2023    Cyst of left ovary 01/30/2023    Pulmonary nodule 1 cm or greater in diameter 01/30/2023    Primary hypertension 01/30/2023    Age-related osteoporosis without current pathological fracture 01/30/2023    Stage 2 chronic kidney disease 01/30/2023       Current Outpatient Medications   Medication Sig Dispense Refill    NON SPECIFIED ATN profile   Test: 557146 1 Each 0    alendronate (FOSAMAX) 70 MG Tab TAKE 1 TABLET BY MOUTH ONCE WEEKLY 12 Tablet 0    levothyroxine (SYNTHROID) 112 MCG Tab Take 1 Tablet by mouth every morning on an empty stomach. 100 Tablet 2    ezetimibe (ZETIA) 10 MG Tab TAKE ONE TABLET BY MOUTH ONE TIME DAILY 90 Tablet 1    mirtazapine (REMERON) 7.5 MG tablet       amLODIPine (NORVASC) 10 MG Tab Take 1 Tablet by mouth every day for 180 days. 90 Tablet 1    Zoster Vac Recomb Adjuvanted (SHINGRIX) 50 MCG/0.5ML Recon Susp       progesterone (PROMETRIUM) 100 MG Cap 1 capsule at bedtime      rosuvastatin (CRESTOR) 10 MG Tab Take 1 Tablet by mouth every day. 90 Tablet 3    losartan (COZAAR) 25 MG Tab Take 1 Tablet by mouth every day. 90 Tablet 3    estradiol (CLIMARA) 0.05 MG/24HR PATCH WEEKLY Place 1 Patch on the skin every 7 days.       No current facility-administered medications for this visit.          Current supplements as per medication list.     Allergies: Midazolam and Promethazine    Current social contact/activities: active in a club,  exercises.      She  reports that she has never smoked. She has never used smokeless tobacco. She reports that she does not currently use alcohol. She reports that she does not use drugs.  Counseling given: Not Answered  Tobacco comments: 2 months in college      ROS:    Gait: Uses no assistive device  Ostomy: No  Other tubes: No  Amputations: No  Chronic oxygen use: No  Last eye exam: 3 weeks ago   Wears hearing aids: Yes   : Reports urinary leakage during the last 6 months that has somewhat interfered with their daily activities or sleep.    Screening:    Up to date     Depression Screening  Little interest or pleasure in doing things?  0 - not at all  Feeling down, depressed , or hopeless? 0 - not at all  Patient Health Questionnaire Score: 0     If depressive symptoms identified deferred to follow up visit unless specifically addressed in assessment and plan.    Interpretation of PHQ-9 Total Score   Score Severity   1-4 No Depression   5-9 Mild Depression   10-14 Moderate Depression   15-19 Moderately Severe Depression   20-27 Severe Depression    Screening for Cognitive Impairment  Do you or any of your friends or family members have any concern about your memory? Yes  Three Minute Recall (Banana, Sunrise, Chair) 3/3    Olegario clock face with all 12 numbers and set the hands to show 20 past 8.  Yes    Cognitive concerns identified deferred for follow up unless specifically addressed in assessment and plan.    Fall Risk Assessment  Has the patient had two or more falls in the last year or any fall with injury in the last year?  No    Safety Assessment  Do you always wear your seatbelt?  Yes  Any changes to home needed to function safely? No  Difficulty hearing.  Yes  Patient counseled about all safety risks that were identified.    Functional Assessment ADLs  Are there any barriers preventing you from cooking for yourself or meeting nutritional needs?  No.    Are there any barriers preventing you from driving  safely or obtaining transportation?  No.    Are there any barriers preventing you from using a telephone or calling for help?  No    Are there any barriers preventing you from shopping?  No.    Are there any barriers preventing you from taking care of your own finances?  No    Are there any barriers preventing you from managing your medications?  No    Are there any barriers preventing you from showering, bathing or dressing yourself? No    Are there any barriers preventing you from doing housework or laundry? No  Are there any barriers preventing you from using the toilet?No  Are you currently engaging in any exercise or physical activity?  Yes.      Self-Assessment of Health  What is your perception of your health? Excellent  Do you sleep more than six hours a night? Yes  In the past 7 days, how much did pain keep you from doing your normal work? None  Do you spend quality time with family or friends (virtually or in person)? Yes  Do you usually eat a heart healthy diet that constists of a variety of fruits, vegetables, whole grains and fiber? Yes  Do you eat foods high in fat and/or Fast Food more than three times per week? Yes    Advance Care Planning  Do you have an Advance Directive, Living Will, Durable Power of , or POLST? No  \      Health Maintenance Summary            Ordered - Hepatitis C Screening (Once) Ordered on 12/6/2023      No completion history exists for this topic.              Overdue - Zoster (Shingles) Vaccines (2 of 3) Overdue since 8/11/2011 06/16/2011  Imm Admin: Zoster Vaccine Live (ZVL) (Zostavax) - HISTORICAL DATA              Postponed - IMM DTaP/Tdap/Td Vaccine (1 - Tdap) Postponed until 2/21/2025      No completion history exists for this topic.              Postponed - Pneumococcal Vaccine: 65+ Years (2 of 2 - PCV) Postponed until 2/21/2025 01/01/2017  Imm Admin: Pneumococcal polysaccharide vaccine (PPSV-23)              Annual Wellness Visit (Yearly) Next due on  2/21/2025 02/21/2024  Level of Service: ANNUAL WELLNESS VISIT-INCLUDES PPPS SUBSEQUE*              Mammogram (Every 2 Years) Next due on 2/22/2025 02/22/2023  MA-DIAGNOSTIC MAMMO RIGHT W/TOMOSYNTHESIS W/O CAD    02/13/2023  MA-SCREENING MAMMO BILAT W/TOMOSYNTHESIS W/CAD    03/08/2012  MA-SCREENING MAMMOGRAM W/ CAD    03/24/2009  MA-SCREENING DIGITAL MAMMO    03/24/2009  MA-CAD SCREENING-MAMMO    Only the first 5 history entries have been loaded, but more history exists.              Ordered - Colorectal Cancer Screening (Colon Cancer Screening Cologuard Stool (FIT DNA) - Preferred) Ordered on 12/28/2023 12/19/2023  COLOGUARD COLON CANCER SCREENING    12/19/2023  COLOGUARD COLON CANCER SCREENING              Bone Density Scan (Every 5 Years) Next due on 2/9/2029 02/09/2024  DS-BONE DENSITY STUDY (DEXA)              Influenza Vaccine (Series Information) Completed      09/05/2023  Imm Admin: Influenza Vaccine, Quadrivalent, Adjuvanted (Pf)    09/01/2022  Imm Admin: Influenza Vaccine Adult HD    10/14/2020  Imm Admin: Influenza Vaccine Quad Inj (Preserved)    12/02/2012  Imm Admin: Influenza (IM) Preservative Free - HISTORICAL DATA              COVID-19 Vaccine (Series Information) Completed      11/01/2023  Imm Admin: Covid-19 Mrna (Spikevax) Moderna 12+ Years    05/22/2023  Imm Admin: MODERNA BIVALENT BOOSTER SARS-COV-2 VACCINE (6+)              Hepatitis A Vaccine (Hep A) (Series Information) Aged Out      No completion history exists for this topic.              Hepatitis B Vaccine (Hep B) (Series Information) Aged Out      No completion history exists for this topic.              HPV Vaccines (Series Information) Aged Out      No completion history exists for this topic.              Polio Vaccine (Inactivated Polio) (Series Information) Aged Out      No completion history exists for this topic.              Meningococcal Immunization (Series Information) Aged Out      No completion history  "exists for this topic.                    Patient Care Team:  Rodríguez Simms D.O. as PCP - General (Family Medicine)        Social History     Tobacco Use    Smoking status: Never    Smokeless tobacco: Never    Tobacco comments:     2 months in college   Vaping Use    Vaping Use: Never used   Substance Use Topics    Alcohol use: Not Currently     Comment: Neuro toxin - I do not drink    Drug use: Never     Family History   Problem Relation Age of Onset    Cancer Other         breast    Heart Disease Mother         heart failure    Hypertension Father         high blood pressure    Hyperlipidemia Father         high blood pressure    Genetic Disorder Maternal Grandmother         Dementia - I am a APOE4 heterozygote    Genetic Disorder Maternal Aunt         Dementia    Genetic Disorder Maternal Uncle         Dementia     She  has a past medical history of Hyperlipidemia, Hypertension, Kidney disease, Positive colorectal cancer screening using Cologuard test (12/28/2023), and Thyroid disease.   Past Surgical History:   Procedure Laterality Date    PRIMARY C SECTION      TUBAL COAGULATION LAPAROSCOPIC BILATERAL         Exam:   /62 (BP Location: Left arm, Patient Position: Sitting, BP Cuff Size: Adult)   Pulse 79   Temp (!) 35.7 °C (96.3 °F) (Temporal)   Resp 14   Ht 1.6 m (5' 3\")   Wt 71.2 kg (156 lb 15.5 oz)   SpO2 96%  Body mass index is 27.81 kg/m².    Hearing excellent.    Dentition good  Alert, oriented in no acute distress.  Eye contact is good, speech goal directed, affect calm    TM with mild cerumen impaction bilaterally     Assessment and Plan. The following treatment and monitoring plan is recommended:  \    Problem List Items Addressed This Visit       Urge incontinence of urine    Relevant Orders    Referral to Physical Therapy    Bilateral impacted cerumen     Removed by curette by physician today  Tolerated procedure well          APOE*3/*4 genotype - Primary     At risk for dementia  Would " [facility] dementia [facility], will write on Rx script. Advised insurance may not cover    Will refer her to memory disorder clinic for further recommendations          Relevant Medications    NON SPECIFIED    Other Relevant Orders    Referral to Neurology     Other Visit Diagnoses       Family history of Alzheimer's disease        Relevant Orders    Referral to Neurology              Services suggested: No services needed at this time  Health Care Screening: Age-appropriate preventive services recommended by USPTF and ACIP covered by Medicare were discussed today. Services ordered if indicated and agreed upon by the patient.  Referrals offered: Community-based lifestyle interventions to reduce health risks and promote self-management and wellness, fall prevention, nutrition, physical activity, tobacco-use cessation, weight loss, and mental health services as per orders if indicated.    Discussion today about general wellness and lifestyle habits:    Prevent falls and reduce trip hazards; Cautioned about securing or removing rugs.  Have a working fire alarm and carbon monoxide detector;   Engage in regular physical activity and social activities     Follow-up: Return in about 6 months (around 8/21/2024).

## 2024-02-21 NOTE — LETTER
[facility]  Rodríguez Simms D.O.  [address]  [address]  [place] [address]  Fax: [phone]   Authorization for Release/Disclosure of   Protected Health Information   Name: BENOIT HO : 1955 SSN: [id number]   Address: [address] [address] Phone:    [phone] (home) [phone] (work)   I authorize the entity listed below to release/disclose the PHI below to:   [facility]/Rodríguez Simms D.O. and Rodríguez Simms D.O.   Provider or Entity Name:  [facility]    Address   City, State, [facility]   Phone:      Fax:     Reason for request: continuity of care   Information to be released:    [  ] LAST COLONOSCOPY,  including any PATH REPORT and follow-up  [  ] LAST FIT/COLOGUARD RESULT [  ] LAST DEXA  [  ] LAST MAMMOGRAM  [  ] LAST PAP  [  ] LAST LABS [  ] RETINA EXAM REPORT  [  ] IMMUNIZATION RECORDS  [ x ] Release all info      [  ] Check here and initial the line next to each item to release ALL health information INCLUDING  _____ Care and treatment for drug and / or alcohol abuse  _____ HIV testing, infection status, or AIDS  _____ Genetic Testing    DATES OF SERVICE OR TIME PERIOD TO BE DISCLOSED: _____________  I understand and acknowledge that:  * This Authorization may be revoked at any time by you in writing, except if your health information has already been used or disclosed.  * Your health information that will be used or disclosed as a result of you signing this authorization could be re-disclosed by the recipient. If this occurs, your re-disclosed health information may no longer be protected by State or Federal laws.  * You may refuse to sign this Authorization. Your refusal will not affect your ability to obtain treatment.  * This Authorization becomes effective upon signing and will  on (date) __________.      If no date is indicated, this Authorization will  one (1) year from the signature date.    Name: Benoit Ho  Signature: Date:   2024      PLEASE FAX REQUESTED RECORDS BACK TO: [phone]

## 2024-02-22 NOTE — ASSESSMENT & PLAN NOTE
At risk for dementia  Would like a dementia screening panel from [facility], will write on Rx script. Advised insurance may not cover    Will refer her to memory disorder clinic for further recommendations

## 2024-02-26 ENCOUNTER — APPOINTMENT (OUTPATIENT)
Dept: ADMISSIONS | Facility: MEDICAL CENTER | Age: 69
End: 2024-02-26
Attending: SPECIALIST
Payer: MEDICARE

## 2024-02-28 ENCOUNTER — PRE-ADMISSION TESTING (OUTPATIENT)
Dept: ADMISSIONS | Facility: MEDICAL CENTER | Age: 69
End: 2024-02-28
Attending: SPECIALIST
Payer: MEDICARE

## 2024-02-28 ENCOUNTER — ANESTHESIA EVENT (OUTPATIENT)
Dept: SURGERY | Facility: MEDICAL CENTER | Age: 69
End: 2024-02-28
Payer: MEDICARE

## 2024-02-29 ENCOUNTER — ANESTHESIA (OUTPATIENT)
Dept: SURGERY | Facility: MEDICAL CENTER | Age: 69
End: 2024-02-29
Payer: MEDICARE

## 2024-02-29 ENCOUNTER — PHARMACY VISIT (OUTPATIENT)
Dept: PHARMACY | Facility: MEDICAL CENTER | Age: 69
End: 2024-02-29
Payer: COMMERCIAL

## 2024-02-29 ENCOUNTER — APPOINTMENT (OUTPATIENT)
Dept: RADIOLOGY | Facility: MEDICAL CENTER | Age: 69
End: 2024-02-29
Attending: SPECIALIST
Payer: MEDICARE

## 2024-02-29 ENCOUNTER — HOSPITAL ENCOUNTER (OUTPATIENT)
Facility: MEDICAL CENTER | Age: 69
End: 2024-02-29
Attending: SPECIALIST | Admitting: SPECIALIST
Payer: MEDICARE

## 2024-02-29 VITALS
WEIGHT: 154.1 LBS | RESPIRATION RATE: 16 BRPM | DIASTOLIC BLOOD PRESSURE: 64 MMHG | HEART RATE: 81 BPM | TEMPERATURE: 96.6 F | HEIGHT: 62 IN | OXYGEN SATURATION: 94 % | SYSTOLIC BLOOD PRESSURE: 119 MMHG | BODY MASS INDEX: 28.36 KG/M2

## 2024-02-29 LAB
ABO + RH BLD: NORMAL
ABO GROUP BLD: NORMAL
ALBUMIN SERPL BCP-MCNC: 4.8 G/DL (ref 3.2–4.9)
ALBUMIN/GLOB SERPL: 1.8 G/DL
ALP SERPL-CCNC: 46 U/L (ref 30–99)
ALT SERPL-CCNC: 20 U/L (ref 2–50)
ANION GAP SERPL CALC-SCNC: 15 MMOL/L (ref 7–16)
APTT PPP: 27.7 SEC (ref 24.7–36)
AST SERPL-CCNC: 21 U/L (ref 12–45)
BILIRUB SERPL-MCNC: 0.7 MG/DL (ref 0.1–1.5)
BLD GP AB SCN SERPL QL: NORMAL
BUN SERPL-MCNC: 19 MG/DL (ref 8–22)
CALCIUM ALBUM COR SERPL-MCNC: 8.6 MG/DL (ref 8.5–10.5)
CALCIUM SERPL-MCNC: 9.2 MG/DL (ref 8.5–10.5)
CANCER AG125 SERPL-ACNC: 24.2 U/ML (ref 0–35)
CHLORIDE SERPL-SCNC: 103 MMOL/L (ref 96–112)
CO2 SERPL-SCNC: 20 MMOL/L (ref 20–33)
CREAT SERPL-MCNC: 0.86 MG/DL (ref 0.5–1.4)
EKG IMPRESSION: NORMAL
ERYTHROCYTE [DISTWIDTH] IN BLOOD BY AUTOMATED COUNT: 39.2 FL (ref 35.9–50)
GFR SERPLBLD CREATININE-BSD FMLA CKD-EPI: 72 ML/MIN/1.73 M 2
GLOBULIN SER CALC-MCNC: 2.6 G/DL (ref 1.9–3.5)
GLUCOSE SERPL-MCNC: 96 MG/DL (ref 65–99)
HCT VFR BLD AUTO: 47.8 % (ref 37–47)
HGB BLD-MCNC: 17.5 G/DL (ref 12–16)
INR PPP: 0.96 (ref 0.87–1.13)
MCH RBC QN AUTO: 30.9 PG (ref 27–33)
MCHC RBC AUTO-ENTMCNC: 36.6 G/DL (ref 32.2–35.5)
MCV RBC AUTO: 84.5 FL (ref 81.4–97.8)
PATHOLOGY CONSULT NOTE: NORMAL
PLATELET # BLD AUTO: 236 K/UL (ref 164–446)
PMV BLD AUTO: 9.4 FL (ref 9–12.9)
POTASSIUM SERPL-SCNC: 3.8 MMOL/L (ref 3.6–5.5)
PROT SERPL-MCNC: 7.4 G/DL (ref 6–8.2)
PROTHROMBIN TIME: 12.9 SEC (ref 12–14.6)
RBC # BLD AUTO: 5.66 M/UL (ref 4.2–5.4)
RH BLD: NORMAL
SODIUM SERPL-SCNC: 138 MMOL/L (ref 135–145)
WBC # BLD AUTO: 7.6 K/UL (ref 4.8–10.8)

## 2024-02-29 PROCEDURE — 700101 HCHG RX REV CODE 250: Performed by: ANESTHESIOLOGY

## 2024-02-29 PROCEDURE — 36415 COLL VENOUS BLD VENIPUNCTURE: CPT

## 2024-02-29 PROCEDURE — 700101 HCHG RX REV CODE 250: Performed by: SPECIALIST

## 2024-02-29 PROCEDURE — 86900 BLOOD TYPING SEROLOGIC ABO: CPT

## 2024-02-29 PROCEDURE — RXMED WILLOW AMBULATORY MEDICATION CHARGE: Performed by: STUDENT IN AN ORGANIZED HEALTH CARE EDUCATION/TRAINING PROGRAM

## 2024-02-29 PROCEDURE — 160031 HCHG SURGERY MINUTES - 1ST 30 MINS LEVEL 5: Performed by: SPECIALIST

## 2024-02-29 PROCEDURE — 86850 RBC ANTIBODY SCREEN: CPT

## 2024-02-29 PROCEDURE — 88112 CYTOPATH CELL ENHANCE TECH: CPT

## 2024-02-29 PROCEDURE — 160035 HCHG PACU - 1ST 60 MINS PHASE I: Performed by: SPECIALIST

## 2024-02-29 PROCEDURE — 88305 TISSUE EXAM BY PATHOLOGIST: CPT

## 2024-02-29 PROCEDURE — 88307 TISSUE EXAM BY PATHOLOGIST: CPT

## 2024-02-29 PROCEDURE — 160009 HCHG ANES TIME/MIN: Performed by: SPECIALIST

## 2024-02-29 PROCEDURE — 93005 ELECTROCARDIOGRAM TRACING: CPT | Performed by: SPECIALIST

## 2024-02-29 PROCEDURE — 700105 HCHG RX REV CODE 258: Performed by: SPECIALIST

## 2024-02-29 PROCEDURE — 160042 HCHG SURGERY MINUTES - EA ADDL 1 MIN LEVEL 5: Performed by: SPECIALIST

## 2024-02-29 PROCEDURE — 85730 THROMBOPLASTIN TIME PARTIAL: CPT

## 2024-02-29 PROCEDURE — 160036 HCHG PACU - EA ADDL 30 MINS PHASE I: Performed by: SPECIALIST

## 2024-02-29 PROCEDURE — 160002 HCHG RECOVERY MINUTES (STAT): Performed by: SPECIALIST

## 2024-02-29 PROCEDURE — 160046 HCHG PACU - 1ST 60 MINS PHASE II: Performed by: SPECIALIST

## 2024-02-29 PROCEDURE — 88331 PATH CONSLTJ SURG 1 BLK 1SPC: CPT

## 2024-02-29 PROCEDURE — 160025 RECOVERY II MINUTES (STATS): Performed by: SPECIALIST

## 2024-02-29 PROCEDURE — 86901 BLOOD TYPING SEROLOGIC RH(D): CPT

## 2024-02-29 PROCEDURE — 502714 HCHG ROBOTIC SURGERY SERVICES: Performed by: SPECIALIST

## 2024-02-29 PROCEDURE — 93010 ELECTROCARDIOGRAM REPORT: CPT | Performed by: INTERNAL MEDICINE

## 2024-02-29 PROCEDURE — A9270 NON-COVERED ITEM OR SERVICE: HCPCS | Performed by: ANESTHESIOLOGY

## 2024-02-29 PROCEDURE — 80053 COMPREHEN METABOLIC PANEL: CPT

## 2024-02-29 PROCEDURE — 160048 HCHG OR STATISTICAL LEVEL 1-5: Performed by: SPECIALIST

## 2024-02-29 PROCEDURE — 700111 HCHG RX REV CODE 636 W/ 250 OVERRIDE (IP): Mod: JZ | Performed by: ANESTHESIOLOGY

## 2024-02-29 PROCEDURE — 85027 COMPLETE CBC AUTOMATED: CPT

## 2024-02-29 PROCEDURE — 85610 PROTHROMBIN TIME: CPT

## 2024-02-29 PROCEDURE — 700102 HCHG RX REV CODE 250 W/ 637 OVERRIDE(OP): Performed by: ANESTHESIOLOGY

## 2024-02-29 PROCEDURE — 71045 X-RAY EXAM CHEST 1 VIEW: CPT

## 2024-02-29 PROCEDURE — 700111 HCHG RX REV CODE 636 W/ 250 OVERRIDE (IP): Performed by: ANESTHESIOLOGY

## 2024-02-29 PROCEDURE — 86304 IMMUNOASSAY TUMOR CA 125: CPT

## 2024-02-29 RX ORDER — LIDOCAINE HYDROCHLORIDE 40 MG/ML
SOLUTION TOPICAL PRN
Status: DISCONTINUED | OUTPATIENT
Start: 2024-02-29 | End: 2024-02-29 | Stop reason: SURG

## 2024-02-29 RX ORDER — ONDANSETRON 2 MG/ML
INJECTION INTRAMUSCULAR; INTRAVENOUS PRN
Status: DISCONTINUED | OUTPATIENT
Start: 2024-02-29 | End: 2024-02-29 | Stop reason: SURG

## 2024-02-29 RX ORDER — MIDAZOLAM HYDROCHLORIDE 1 MG/ML
INJECTION INTRAMUSCULAR; INTRAVENOUS PRN
Status: DISCONTINUED | OUTPATIENT
Start: 2024-02-29 | End: 2024-02-29 | Stop reason: SURG

## 2024-02-29 RX ORDER — MULTIVIT WITH MINERALS/LUTEIN
1000 TABLET ORAL DAILY
Status: ON HOLD | COMMUNITY
End: 2024-02-29

## 2024-02-29 RX ORDER — DIPHENHYDRAMINE HYDROCHLORIDE 50 MG/ML
12.5 INJECTION INTRAMUSCULAR; INTRAVENOUS
Status: DISCONTINUED | OUTPATIENT
Start: 2024-02-29 | End: 2024-02-29 | Stop reason: HOSPADM

## 2024-02-29 RX ORDER — SODIUM CHLORIDE, SODIUM LACTATE, POTASSIUM CHLORIDE, CALCIUM CHLORIDE 600; 310; 30; 20 MG/100ML; MG/100ML; MG/100ML; MG/100ML
INJECTION, SOLUTION INTRAVENOUS CONTINUOUS
Status: DISCONTINUED | OUTPATIENT
Start: 2024-02-29 | End: 2024-02-29 | Stop reason: HOSPADM

## 2024-02-29 RX ORDER — ONDANSETRON 2 MG/ML
4 INJECTION INTRAMUSCULAR; INTRAVENOUS
Status: COMPLETED | OUTPATIENT
Start: 2024-02-29 | End: 2024-02-29

## 2024-02-29 RX ORDER — CALCIUM CARBONATE 300MG(750)
800 TABLET,CHEWABLE ORAL
COMMUNITY

## 2024-02-29 RX ORDER — EPHEDRINE SULFATE 50 MG/ML
INJECTION, SOLUTION INTRAVENOUS PRN
Status: DISCONTINUED | OUTPATIENT
Start: 2024-02-29 | End: 2024-02-29 | Stop reason: SURG

## 2024-02-29 RX ORDER — ROCURONIUM BROMIDE 10 MG/ML
INJECTION, SOLUTION INTRAVENOUS PRN
Status: DISCONTINUED | OUTPATIENT
Start: 2024-02-29 | End: 2024-02-29 | Stop reason: SURG

## 2024-02-29 RX ORDER — ACETAMINOPHEN 500 MG
1000 TABLET ORAL ONCE
Status: COMPLETED | OUTPATIENT
Start: 2024-02-29 | End: 2024-02-29

## 2024-02-29 RX ORDER — ASCORBIC ACID 500 MG
1500 TABLET ORAL DAILY
Qty: 30 TABLET | Refills: 0 | Status: SHIPPED | OUTPATIENT
Start: 2024-02-29

## 2024-02-29 RX ORDER — ELECTROLYTES/DEXTROSE
1 SOLUTION, ORAL ORAL
COMMUNITY

## 2024-02-29 RX ORDER — BUPIVACAINE HYDROCHLORIDE AND EPINEPHRINE 2.5; 5 MG/ML; UG/ML
INJECTION, SOLUTION EPIDURAL; INFILTRATION; INTRACAUDAL; PERINEURAL
Status: DISCONTINUED | OUTPATIENT
Start: 2024-02-29 | End: 2024-02-29 | Stop reason: HOSPADM

## 2024-02-29 RX ORDER — CEFAZOLIN SODIUM 1 G/3ML
INJECTION, POWDER, FOR SOLUTION INTRAMUSCULAR; INTRAVENOUS PRN
Status: DISCONTINUED | OUTPATIENT
Start: 2024-02-29 | End: 2024-02-29 | Stop reason: SURG

## 2024-02-29 RX ORDER — LIDOCAINE HYDROCHLORIDE 20 MG/ML
INJECTION, SOLUTION EPIDURAL; INFILTRATION; INTRACAUDAL; PERINEURAL PRN
Status: DISCONTINUED | OUTPATIENT
Start: 2024-02-29 | End: 2024-02-29 | Stop reason: SURG

## 2024-02-29 RX ORDER — OXYCODONE HCL 5 MG/5 ML
5 SOLUTION, ORAL ORAL
Status: COMPLETED | OUTPATIENT
Start: 2024-02-29 | End: 2024-02-29

## 2024-02-29 RX ORDER — OXYCODONE HCL 5 MG/5 ML
10 SOLUTION, ORAL ORAL
Status: COMPLETED | OUTPATIENT
Start: 2024-02-29 | End: 2024-02-29

## 2024-02-29 RX ORDER — DEXAMETHASONE SODIUM PHOSPHATE 4 MG/ML
INJECTION, SOLUTION INTRA-ARTICULAR; INTRALESIONAL; INTRAMUSCULAR; INTRAVENOUS; SOFT TISSUE PRN
Status: DISCONTINUED | OUTPATIENT
Start: 2024-02-29 | End: 2024-02-29 | Stop reason: SURG

## 2024-02-29 RX ORDER — HALOPERIDOL 5 MG/ML
1 INJECTION INTRAMUSCULAR
Status: DISCONTINUED | OUTPATIENT
Start: 2024-02-29 | End: 2024-02-29 | Stop reason: HOSPADM

## 2024-02-29 RX ORDER — VIT C/B6/B5/MAGNESIUM/HERB 173 50-5-6-5MG
500 CAPSULE ORAL
COMMUNITY

## 2024-02-29 RX ORDER — ONDANSETRON 4 MG/1
TABLET, FILM COATED ORAL
Qty: 28 TABLET | Refills: 0 | OUTPATIENT
Start: 2024-02-29

## 2024-02-29 RX ORDER — CHLORAL HYDRATE 500 MG
1000 CAPSULE ORAL
COMMUNITY

## 2024-02-29 RX ORDER — CELECOXIB 200 MG/1
200 CAPSULE ORAL ONCE
Status: COMPLETED | OUTPATIENT
Start: 2024-02-29 | End: 2024-02-29

## 2024-02-29 RX ORDER — DEXMEDETOMIDINE HYDROCHLORIDE 100 UG/ML
INJECTION, SOLUTION INTRAVENOUS PRN
Status: DISCONTINUED | OUTPATIENT
Start: 2024-02-29 | End: 2024-02-29 | Stop reason: SURG

## 2024-02-29 RX ORDER — SODIUM CHLORIDE, SODIUM LACTATE, POTASSIUM CHLORIDE, CALCIUM CHLORIDE 600; 310; 30; 20 MG/100ML; MG/100ML; MG/100ML; MG/100ML
INJECTION, SOLUTION INTRAVENOUS CONTINUOUS
Status: ACTIVE | OUTPATIENT
Start: 2024-02-29 | End: 2024-02-29

## 2024-02-29 RX ORDER — OXYCODONE HYDROCHLORIDE 5 MG/1
TABLET ORAL
Qty: 28 TABLET | Refills: 0 | OUTPATIENT
Start: 2024-02-29

## 2024-02-29 RX ADMIN — ONDANSETRON 4 MG: 2 INJECTION INTRAMUSCULAR; INTRAVENOUS at 09:51

## 2024-02-29 RX ADMIN — DEXMEDETOMIDINE HYDROCHLORIDE 25 MCG: 100 INJECTION, SOLUTION INTRAVENOUS at 09:01

## 2024-02-29 RX ADMIN — MIDAZOLAM HYDROCHLORIDE 2 MG: 1 INJECTION, SOLUTION INTRAMUSCULAR; INTRAVENOUS at 07:31

## 2024-02-29 RX ADMIN — FENTANYL CITRATE 25 MCG: 50 INJECTION, SOLUTION INTRAMUSCULAR; INTRAVENOUS at 09:55

## 2024-02-29 RX ADMIN — ROCURONIUM BROMIDE 50 MG: 50 INJECTION, SOLUTION INTRAVENOUS at 07:33

## 2024-02-29 RX ADMIN — LIDOCAINE HYDROCHLORIDE 60 MG: 20 INJECTION, SOLUTION EPIDURAL; INFILTRATION; INTRACAUDAL at 07:33

## 2024-02-29 RX ADMIN — FENTANYL CITRATE 25 MCG: 50 INJECTION, SOLUTION INTRAMUSCULAR; INTRAVENOUS at 09:52

## 2024-02-29 RX ADMIN — DEXAMETHASONE SODIUM PHOSPHATE 8 MG: 4 INJECTION INTRA-ARTICULAR; INTRALESIONAL; INTRAMUSCULAR; INTRAVENOUS; SOFT TISSUE at 07:59

## 2024-02-29 RX ADMIN — PROPOFOL 200 MG: 10 INJECTION, EMULSION INTRAVENOUS at 07:33

## 2024-02-29 RX ADMIN — EPHEDRINE SULFATE 20 MG: 50 INJECTION, SOLUTION INTRAVENOUS at 07:45

## 2024-02-29 RX ADMIN — ACETAMINOPHEN 1000 MG: 500 TABLET ORAL at 06:22

## 2024-02-29 RX ADMIN — FENTANYL CITRATE 50 MCG: 50 INJECTION, SOLUTION INTRAMUSCULAR; INTRAVENOUS at 08:35

## 2024-02-29 RX ADMIN — PROPOFOL 200 MCG/KG/MIN: 10 INJECTION, EMULSION INTRAVENOUS at 07:40

## 2024-02-29 RX ADMIN — FENTANYL CITRATE 50 MCG: 50 INJECTION, SOLUTION INTRAMUSCULAR; INTRAVENOUS at 08:07

## 2024-02-29 RX ADMIN — ONDANSETRON 4 MG: 2 INJECTION INTRAMUSCULAR; INTRAVENOUS at 07:59

## 2024-02-29 RX ADMIN — FENTANYL CITRATE 50 MCG: 50 INJECTION, SOLUTION INTRAMUSCULAR; INTRAVENOUS at 07:33

## 2024-02-29 RX ADMIN — SUGAMMADEX 200 MG: 100 INJECTION, SOLUTION INTRAVENOUS at 08:35

## 2024-02-29 RX ADMIN — CELECOXIB 200 MG: 200 CAPSULE ORAL at 06:23

## 2024-02-29 RX ADMIN — LIDOCAINE HYDROCHLORIDE 4 ML: 40 SOLUTION TOPICAL at 07:40

## 2024-02-29 RX ADMIN — FENTANYL CITRATE 50 MCG: 50 INJECTION, SOLUTION INTRAMUSCULAR; INTRAVENOUS at 07:50

## 2024-02-29 RX ADMIN — EPHEDRINE SULFATE 20 MG: 50 INJECTION, SOLUTION INTRAVENOUS at 07:56

## 2024-02-29 RX ADMIN — SODIUM CHLORIDE, POTASSIUM CHLORIDE, SODIUM LACTATE AND CALCIUM CHLORIDE: 600; 310; 30; 20 INJECTION, SOLUTION INTRAVENOUS at 06:22

## 2024-02-29 RX ADMIN — OXYCODONE HYDROCHLORIDE 5 MG: 5 SOLUTION ORAL at 09:51

## 2024-02-29 RX ADMIN — CEFAZOLIN 2 G: 1 INJECTION, POWDER, FOR SOLUTION INTRAMUSCULAR; INTRAVENOUS at 07:40

## 2024-02-29 ASSESSMENT — FIBROSIS 4 INDEX
FIB4 SCORE: 1.16
FIB4 SCORE: 1.16

## 2024-02-29 ASSESSMENT — PAIN SCALES - GENERAL: PAIN_LEVEL: 4

## 2024-02-29 ASSESSMENT — PAIN DESCRIPTION - PAIN TYPE: TYPE: SURGICAL PAIN

## 2024-02-29 NOTE — ANESTHESIA PROCEDURE NOTES
Airway    Date/Time: 2/29/2024 7:40 AM    Performed by: Ellen Romero M.D.  Authorized by: Ellen Romero M.D.    Location:  OR  Urgency:  Elective  Difficult Airway: No    Indications for Airway Management:  Anesthesia      Spontaneous Ventilation: absent    Sedation Level:  Deep  Preoxygenated: Yes    Patient Position:  Sniffing  MILS Maintained Throughout: No    Mask Difficulty Assessment:  1 - vent by mask  Final Airway Type:  Endotracheal airway  Final Endotracheal Airway:  ETT  Cuffed: Yes    Technique Used for Successful ETT Placement:  Direct laryngoscopy  Devices/Methods Used in Placement:  Intubating stylet    Insertion Site:  Oral  Blade Type:  [place]  Laryngoscope Blade/Videolaryngoscope Blade Size:  2  ETT Size (mm):  6.5  Placement Verified by: capnometry    Cormack-Lehane Classification:  Grade I - full view of glottis  Number of Attempts at Approach:  1

## 2024-02-29 NOTE — OR SURGEON
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    Anesthesiologist/Type of Anesthesia:  Anesthesiologist: Ellen Romero M.D./General    Surgical Staff:  Assistant: Sahara Valdivia P.A.-C.  Circulator: Genna Askew R.N.  Scrub Person: Jinny Joseph; Mali Jeffery; Peterson Demarco    Specimens removed if any:  ID Type Source Tests Collected by Time Destination   A : left fallopian tube and ovary Tissue Ovary PATHOLOGY SPECIMEN Fer Waite M.D. 2/29/2024  8:08 AM    B : uterus, cervix, right fallopian tube and ovary Tissue Uterus PATHOLOGY SPECIMEN Fer Waite M.D. 2/29/2024  8:24 AM    C : pelvic washings Other Other PATHOLOGY SPECIMEN Fer Waite M.D. 2/29/2024  8:30 AM        Estimated Blood Loss: 50 cc    Findings: No evidence of ascites.  Normal right left diaphragm.  Liver capsule smooth.  Stomach appear grossly normal.  Abdominal peritoneal surfaces were unremarkable.  Omentum pair grossly normal.    In the pelvis uterus was about 8 weeks in size there was a small posterior fibroid noted.  Previous tubal ligation is noted.  The right ovary was absent.  The left ovary contained a small cystic wall cyst with no evidence of excrescences.  It was free of adhesions from adjacent structures.  Was mobile.  There was no evidence of seeding along the bladder pelvic and cul-de-sac peritoneum.    Intraoperative frozen section revealed benign serous cystadenoma thus we did not proceed with surgical staging.    Complications: None        2/29/2024 8:48 AM Fer Waite M.D.

## 2024-02-29 NOTE — DISCHARGE INSTRUCTIONS
Home Health Instructions :     1. No heavy lifting greater than 10 pounds for a minimum 6 weeks  2. Nothing in the vagina (ie no tampons, douching, intercourse) for a minimum of 6 weeks  3. No driving while taking narcotics  4. Call our office [phone] if you develop any fevers, chills, nausea/vomiting, heavy vaginal bleeding, or redness, tenderness, and/or  drainage from your wound, if you have persistent watery discharge while ambulating or stool draining from the vagina.  5. Showering with warm water is ok, no bathing or swimming  6. You may remove wound dressing on postoperative day 1, and place lose bandages for two weeks  7. You may have vaginal spotting or light bleeding which is normal.  8. If you have not had a bowel movement for 2 days, please take over the counter Milk of Magnesium, 1 tablespoon every 4 hours. After 4  doses and if you still have not had a bowel movement, please call your doctor.  9. You may eat a soft diet, such as soup, liquid, for day #1 and if tolerating you may resume your regular diet          Prescription given for Ascorbic Acid , Roxicodone , Zofran .  Last pain medication given at -.    A follow-up appointment should be arranged with your doctor in [place] [phone] ; call to schedule.    You should CALL YOUR PHYSICIAN if you develop:  Fever greater than 101 degrees F.  Pain not relieved by medication, or persistent nausea or vomiting.  Excessive bleeding (blood soaking through dressing) or unexpected drainage from the wound.  Extreme redness or swelling around the incision site, drainage of pus or foul smelling drainage.  Inability to urinate or empty your bladder within 8 hours.  Problems with breathing or chest pain.    You should call 911 if you develop problems with breathing or chest pain.  If you are unable to contact your doctor or surgical center, you should go to the nearest emergency room or urgent care center.  Physician's telephone #: [phone]     MILD  FLU-LIKE SYMPTOMS ARE NORMAL.  YOU MAY EXPERIENCE GENERALIZED MUSCLE ACHES, THROAT IRRITATION, HEADACHE AND/OR SOME NAUSEA.    If any questions arise, call your doctor.  If your doctor is not available, please feel free to call the Surgical Center at [phone].  The Center is open Monday through Friday from 7AM to 7PM.      A registered nurse may call you a few days after your surgery to see how you are doing after your procedure.    You may also receive a survey in the mail within the next two weeks and we ask that you take a few moments to complete the survey and return it to us.  Our goal is to provide you with very good care and we value your comments.     Depression / Suicide Risk    As you are discharged from this [facility] facility, it is important to learn how to keep safe from harming yourself.    Recognize the warning signs:  Abrupt changes in personality, positive or negative- including increase in energy   Giving away possessions  Change in eating patterns- significant weight changes-  positive or negative  Change in sleeping patterns- unable to sleep or sleeping all the time   Unwillingness or inability to communicate  Depression  Unusual sadness, discouragement and loneliness  Talk of wanting to die  Neglect of personal appearance   Rebelliousness- reckless behavior  Withdrawal from people/activities they love  Confusion- inability to concentrate     If you or a loved one observes any of these behaviors or has concerns about self-harm, here's what you can do:  Talk about it- your feelings and reasons for harming yourself  Remove any means that you might use to hurt yourself (examples: pills, rope, extension cords, firearm)  Get professional help from the community (Mental Health, Substance Abuse, psychological counseling)  Do not be alone:Call your Safe Contact- someone whom you trust who will be there for you.  Call your local CRISIS HOTLINE 765-8672 or 326-565-2435  Call your local  Children's Mobile Crisis Response Team [place] (767) 422-9897 or www.Mobile Backstage.iSquare  Call the toll free National Suicide Prevention Hotlines   National Suicide Prevention Lifeline 995-302-ZSGK (5413)  [place] Line Network 800-SUICIDE (716-3333)    I acknowledge receipt and understanding of these Home Care instructions.

## 2024-02-29 NOTE — ANESTHESIA PREPROCEDURE EVALUATION
Case: [id number] Date/Time: 02/29/24 0715    Procedures:       ROBOTIC TOTAL HYSTERECTOMY BILATERAL SALPINGO OOPHORECTOMY, POSSIBLE SURGICAL STAGING      LYMPHADENECTOMY, ROBOT-ASSISTED, USING DA FABIENNE XI    Pre-op diagnosis: PELVIC MASS, PAIN    Location: [facility] OR 17 / SURGERY [facility]    Surgeons: [place] M.D.            Relevant Problems   CARDIAC   (positive) Atherosclerosis of abdominal aorta (HCC)   (positive) Primary hypertension         (positive) Simple renal cyst   (positive) Stage 2 chronic kidney disease      ENDO   (positive) Hypothyroidism       Physical Exam    Airway   Mallampati: I  TM distance: >3 FB       Cardiovascular   Rhythm: regular  Rate: normal     Dental - normal exam           Pulmonary   Breath sounds clear to auscultation     Abdominal - normal exam     Neurological                Anesthesia Plan    ASA 2       Plan - general       Airway plan will be ETT          Induction: intravenous    Postoperative Plan: Postoperative administration of opioids is intended.    Pertinent diagnostic labs and testing reviewed    Informed Consent:    Anesthetic plan and risks discussed with patient and spouse.    Use of blood products discussed with: whom consented to blood products.

## 2024-02-29 NOTE — PROGRESS NOTES
Medication history reviewed with PT at bedside    [facility] rec is complete per PT reporting    Allergies reviewed.     Patient denies any outpatient antibiotics in the last 30 days.     Patient is not taking anticoagulants.    Preferred pharmacy for this visit - [facility] on [address] ([phone])

## 2024-02-29 NOTE — ANESTHESIA TIME REPORT
Anesthesia Start and Stop Event Times       Date Time Event    2/29/2024 0723 Ready for Procedure     0730 Anesthesia Start     0907 Anesthesia Stop          Responsible Staff  02/29/24      Name Role Begin End    Ellen Romero M.D. Anesth 0730 0907          Overtime Reason:  no overtime (within assigned shift)    Comments:

## 2024-02-29 NOTE — OP REPORT
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    Anesthesiologist/Type of Anesthesia:  Anesthesiologist: Ellen Romero M.D./General    Surgical Staff:  Assistant: Sahara Valdivia P.A.-C.  Circulator: Genna Askew R.N.  Scrub Person: Jinny Joseph; Mali Jeffery; Peterson Demarco    Specimens removed if any:  ID Type Source Tests Collected by Time Destination   A : left fallopian tube and ovary Tissue Ovary PATHOLOGY SPECIMEN [place] M.D. 2024  8:08 AM    B : uterus, cervix, right fallopian tube and ovary Tissue Uterus PATHOLOGY SPECIMEN [place] M.D. 2024  8:24 AM    C : pelvic washings Other Other PATHOLOGY SPECIMEN [place] M.D. 2024  8:30 AM        Estimated Blood Loss: 50 cc    Findings: No evidence of ascites.  Normal right left diaphragm.  Liver capsule smooth.  Stomach appear grossly normal.  Abdominal peritoneal surfaces were unremarkable.  Omentum pair grossly normal.    In the pelvis uterus was about 8 weeks in size there was a small posterior fibroid noted.  Previous tubal ligation is noted.  The right ovary was absent.  The left ovary contained a small cystic wall cyst with no evidence of excrescences.  It was free of adhesions from adjacent structures.  Was mobile.  There was no evidence of seeding along the bladder pelvic and cul-de-sac peritoneum.    Intraoperative frozen section revealed benign serous cystadenoma thus we did not proceed with surgical staging.    Complications: None    Indication: Ms. Ho is a pleasant 69 year old  female with complex left ovarian cyst. She was in her usual state of health until she presented to the ED 23 after a fall. She underwent a CT CAP on 23 that showed 6 mm and 15 mm RLL pulmonary nodules, normal  uterus and right adnexa, 3.9 cm left adnexal cyst, no LAD.  in June 2023 reportedly normal. She underwent a followup TVUS 8/10/23 which showed uterus 4.5 x 5.3 x 3.4 cm, EMS 4 mm, 1.9 cm right ovary, 4.5 cm left ovary with a 4.2 cm simple appearing cyst, no free fluid. She was seen by you, Dr. Diaz, for followup on 11/29/2023. Her  on 11/29/23 was 21.0. She underwent another TVUS on 12/6/23 feeling 4.7 cm left ovary with 4.2 cm cyst containing a 5 mm internal hyperechoic focus.    Given this finding there was concern that she might have underlying ovarian neoplasm thus patient was recommended to undergo surgical pathological evaluation.  Thus patient is being admitted to undergo robotic hysterectomy with bilateral with bilateral salpingo-oophorectomy.  However at the time of the surgery she was noted to have the left ovary.  Removed previously.  This was not noted or conveyed in the history.  Thus we proceeded with removal of the robotic assisted hysterectomy with left salpingo-oophorectomy.      Procedure: After achieving adequate general anesthesia, patient was prepped and draped and positioned in modified dorsal lithotomy position. Antibiotics were delivered. Surgical timeout was called. We then proceeded with our intended procedure.     A 1 cm umbilical incision was made.  A Veress needle was inserted without difficulty. Abdominal pressure was noted to be < 5mm Hg.  Pneumoperitoneum was achieved to the abdominal pressure of 15 mmHg.  A 8 mm trocar was inserted without difficulty.  After completion of this, a laparoscope was placed through this port and exploratory laparoscopy revealed the above findings.  Two 8 mm ports were placed in the right upper quadrant 8 cm apart while one 8 mm port was placed in the left upper quadrant 8 cm apart.  After completion of this, the patient was placed in steep Trendelenburg position.  The robotic system was then docked and after docking the robotic system, the  instrumentation was inserted under direct laparoscopic visualization to insure that there was no injury to the abdominal contents.  Once this was completed, the robotic camera was then docked.  We then proceeded with our da Paul portion of the procedure.    The posterior leaf of the broad ligament was incised.  The pararectal spaces were developed.  The ureter was identified in the medial leaf of the peritoneum. The avascular space of Graves was then created.  The  left pelvic ureters was identified.  The left ovarian vessels were then subsequently isolated and bipolar cautery was used to cauterizeeft IP and subsequently divided.  The medial leaf of the peritoneum was then incised down to the level of the uterosacral ligament.  Left ureter was dissected laterally while developing the Okabayashi space.  The round ligament was then divided followed by the anterior broad leaf ligament. The Prograsp from the  robotic “3rd arm” was then used to grasp the triple pedicle and counter traction was provided while the cardinal ligament was exposed. Left uterine artery and vein were then subsequently skeletonized.  Using the bipolar cautery, the uterine artery and vein were then cauterized juxtaposition to the fundus of the uterus.  The remainder of the lower uterine segment was likewise divided. After completion of this, the uterus was then retracted ventrally and the uterosacral ligaments were then independently divided.  Great care was then taken to clearly not injure the ureter.  After the uterosacral ligaments were then divided, the anterior branches of the uterine vessels were then subsequently skeletonized and cauterized with bipolar cautery and divided.  The bladder peritoneum was then subsequently taken down. I then turn my focus towards the right side of the hysterectomy. The right posterior broad leaf ligament was incised. The right  pararectal space was developed.  The right ureter was identified in the medial leaf  of the peritoneum. The avascular space of Graves was then created.  The right pelvic ureters was identified.  The right ovarian vessels were then subsequently isolated and bipolar cautery was used to cauterizeeft IP and subsequently divided.  The medial leaf of the peritoneum was then incised down to the level of the uterosacral ligament. Right ureter was dissected laterally while developing the Okabayashi space. The Prograsp from the  robotic “3rd arm” was then used to grasp the triple pedicle and counter traction was provided while the cardinal ligament was exposed. Left uterine artery and vein were then subsequently skeletonized.  Using the bipolar cautery, the uterine artery and vein were then cauterized juxtaposition to the fundus of the uterus.  The remainder of the lower uterine segment was likewise divided. After completion of this, the uterus was then retracted ventrally and the uterosacral ligaments were then independently divided.  Great care was then taken to clearly not injure the ureter.  After the uterosacral ligaments were then divided, the anterior branches of the uterine vessels were then subsequently skeletonized and cauterized with bipolar cautery and divided. Once we were assured that the bladder was dissected off the paracervical fascia, the posterior colpotomy posterior colpotomy was made at the right uterosacral ligament.   The vagina was circumferentially incised to complete the hysterectomy and BSO.  A 15 cm endobag was then delivered through the vagina. The specimen was then placed in the endobag and ziplocked and delivered through the vagina. Following completion of this, I then turn my attention towards the vaginal cuff which was then closed with O Quill PDS suture in 2 layer running fashion.      The pelvis was then copiously irrigated with water and we established hemostasis. We then accounted for sponges and needles. Once this was confirmed correct, robotic instrumentation with  withdraw and pneumoperitoneum was allowed to escape through the 8 mm ports. After appropriate expelling all the intrabdominal pneumoperitoneum, we irrigated the subcutaneous tissue with water. The skin was reapproximated with 3-0 monocryl suture. The incision was then injected with 0.25% Marcaine for local anesthetic effect. Dressings were appropriately placed.     Patient tolerated the procedure well without any difficulties and was extubated and transferred to the PACU in stable excellet condition.

## 2024-02-29 NOTE — OR NURSING
Patient arrived to unit. Patient is AOx4. Transferred to chair appropriately. Patient's pain is 4/10. Declines pain medication at this time. Patient's dressings to abdomen are CDI x4. Patient voided appropriately when she arrived. Tolerating liquids at this time. Discharge instructions discussed with the patient. Patient denies any further questions at this time. Patient discharged home to responsible adult at 1053.

## 2024-02-29 NOTE — ANESTHESIA POSTPROCEDURE EVALUATION
Patient: Estefani oH    Procedure Summary       Date: 02/29/24 Room / Location: [place] [id number] / SURGERY [facility]    Anesthesia Start: 0730 Anesthesia Stop: 0907    Procedure: ROBOTIC TOTAL HYSTERECTOMY BILATERAL SALPINGO OOPHORECTOMY, (Pelvis) Diagnosis: (benign serous cystadenoma)    Surgeons: [place] M.D. Responsible Provider: Ellen Romero M.D.    Anesthesia Type: general ASA Status: 2            Final Anesthesia Type: general  Last vitals  BP   Blood Pressure : 119/64    Temp   35.9 °C (96.6 °F)    Pulse   81   Resp   16    SpO2   94 %      Anesthesia Post Evaluation    Patient location during evaluation: PACU  Patient participation: complete - patient participated  Level of consciousness: awake and alert  Pain score: 4    Airway patency: patent  Cardiovascular status: adequate  Respiratory status: acceptable and room air  Hydration status: acceptable    PONV: none          No notable events documented.     Nurse Pain Score: 4 (NPRS)

## 2024-04-12 DIAGNOSIS — I10 PRIMARY HYPERTENSION: ICD-10-CM

## 2024-04-12 NOTE — TELEPHONE ENCOUNTER
Received request via: Pharmacy    Was the patient seen in the last year in this department? Yes    Does the patient have an active prescription (recently filled or refills available) for medication(s) requested? No    Pharmacy Name: Pharmacy:   [id number] - [address]     Does the patient have [facility] Plus and need 100 day supply (blood pressure, diabetes and cholesterol meds only)? Patient does not have SCP

## 2024-04-16 RX ORDER — AMLODIPINE BESYLATE 10 MG/1
10 TABLET ORAL DAILY
Qty: 90 TABLET | Refills: 0 | Status: SHIPPED | OUTPATIENT
Start: 2024-04-16

## 2024-04-30 RX ORDER — ALENDRONATE SODIUM 70 MG/1
70 TABLET ORAL
Qty: 12 TABLET | Refills: 0 | Status: SHIPPED | OUTPATIENT
Start: 2024-04-30

## 2024-04-30 NOTE — TELEPHONE ENCOUNTER
Received request via: Pharmacy    Was the patient seen in the last year in this department? Yes    Does the patient have an active prescription (recently filled or refills available) for medication(s) requested? No    Pharmacy Name: [facility]     Does the patient have [facility] Plus and need 100 day supply (blood pressure, diabetes and cholesterol meds only)? Patient does not have SCP

## 2024-06-03 DIAGNOSIS — E78.2 MIXED HYPERLIPIDEMIA: ICD-10-CM

## 2024-06-03 NOTE — TELEPHONE ENCOUNTER
Received request via: Pharmacy    Was the patient seen in the last year in this department? Yes    Does the patient have an active prescription (recently filled or refills available) for medication(s) requested? No    Pharmacy Name: [facility]     Does the patient have [facility] Plus and need 100 day supply (blood pressure, diabetes and cholesterol meds only)? Patient does not have SCP

## 2024-06-04 RX ORDER — ROSUVASTATIN CALCIUM 10 MG/1
10 TABLET, COATED ORAL DAILY
Qty: 90 TABLET | Refills: 0 | Status: SHIPPED | OUTPATIENT
Start: 2024-06-04

## 2024-06-07 DIAGNOSIS — I10 PRIMARY HYPERTENSION: ICD-10-CM

## 2024-06-07 NOTE — TELEPHONE ENCOUNTER
Received request via: Pharmacy    Was the patient seen in the last year in this department? Yes    Does the patient have an active prescription (recently filled or refills available) for medication(s) requested? No    Pharmacy Name: Pharmacy:   [id number] - [address]     Does the patient have retirement Plus and need 100 day supply (blood pressure, diabetes and cholesterol meds only)? Patient does not have SCP

## 2024-06-11 RX ORDER — LOSARTAN POTASSIUM 25 MG/1
25 TABLET ORAL DAILY
Qty: 90 TABLET | Refills: 2 | Status: SHIPPED | OUTPATIENT
Start: 2024-06-11

## 2024-07-06 DIAGNOSIS — E78.2 MIXED HYPERLIPIDEMIA: ICD-10-CM

## 2024-07-06 DIAGNOSIS — Z15.89 APOE*3/*4 GENOTYPE: ICD-10-CM

## 2024-07-08 RX ORDER — EZETIMIBE 10 MG/1
10 TABLET ORAL DAILY
Qty: 90 TABLET | Refills: 0 | Status: SHIPPED | OUTPATIENT
Start: 2024-07-08

## 2024-07-17 DIAGNOSIS — I10 PRIMARY HYPERTENSION: ICD-10-CM

## 2024-07-18 RX ORDER — AMLODIPINE BESYLATE 10 MG/1
10 TABLET ORAL DAILY
Qty: 90 TABLET | Refills: 0 | Status: SHIPPED | OUTPATIENT
Start: 2024-07-18

## 2024-08-05 NOTE — TELEPHONE ENCOUNTER
Received request via: Pharmacy    Was the patient seen in the last year in this department? Yes    Does the patient have an active prescription (recently filled or refills available) for medication(s) requested? No    Pharmacy Name: Pharmacy:   [id number] - [address]     Does the patient have [facility] Plus and need 100 day supply (blood pressure, diabetes and cholesterol meds only)? Patient does not have SCP

## 2024-08-06 RX ORDER — ALENDRONATE SODIUM 70 MG/1
70 TABLET ORAL
Qty: 12 TABLET | Refills: 0 | Status: SHIPPED | OUTPATIENT
Start: 2024-08-06

## 2024-09-26 NOTE — TELEPHONE ENCOUNTER
Received request via: Pharmacy    Was the patient seen in the last year in this department? Yes    Does the patient have an active prescription (recently filled or refills available) for medication(s) requested? No    [facility]     Does the patient have [facility] Plus and need 100-day supply? (This applies to ALL medications) Patient does not have SCP

## 2024-09-27 RX ORDER — ALENDRONATE SODIUM 70 MG/1
70 TABLET ORAL
Qty: 12 TABLET | Refills: 0 | Status: SHIPPED | OUTPATIENT
Start: 2024-09-27

## 2024-10-07 DIAGNOSIS — I10 PRIMARY HYPERTENSION: ICD-10-CM

## 2024-10-07 DIAGNOSIS — E78.2 MIXED HYPERLIPIDEMIA: ICD-10-CM

## 2024-10-07 DIAGNOSIS — Z15.89 APOE*3/*4 GENOTYPE: ICD-10-CM

## 2024-10-08 RX ORDER — AMLODIPINE BESYLATE 10 MG/1
10 TABLET ORAL DAILY
Qty: 90 TABLET | Refills: 2 | Status: SHIPPED | OUTPATIENT
Start: 2024-10-08

## 2024-10-08 RX ORDER — EZETIMIBE 10 MG/1
10 TABLET ORAL DAILY
Qty: 90 TABLET | Refills: 0 | Status: SHIPPED | OUTPATIENT
Start: 2024-10-08

## 2025-01-06 NOTE — TELEPHONE ENCOUNTER
Received request via: Pharmacy    Was the patient seen in the last year in this department? Yes    Does the patient have an active prescription (recently filled or refills available) for medication(s) requested? No    [facility]     Does the patient have long term Plus and need 100-day supply? (This applies to ALL medications) Patient does not have SCP

## 2025-01-07 RX ORDER — LEVOTHYROXINE SODIUM 112 UG/1
112 TABLET ORAL
Qty: 100 TABLET | Refills: 2 | Status: SHIPPED | OUTPATIENT
Start: 2025-01-07

## 2025-01-08 DIAGNOSIS — Z15.89 APOE*3/*4 GENOTYPE: ICD-10-CM

## 2025-01-08 DIAGNOSIS — E78.2 MIXED HYPERLIPIDEMIA: ICD-10-CM

## 2025-01-09 RX ORDER — ALENDRONATE SODIUM 70 MG/1
TABLET ORAL
Qty: 12 TABLET | Refills: 1 | Status: SHIPPED | OUTPATIENT
Start: 2025-01-09

## 2025-01-09 RX ORDER — EZETIMIBE 10 MG/1
10 TABLET ORAL DAILY
Qty: 90 TABLET | Refills: 2 | Status: SHIPPED | OUTPATIENT
Start: 2025-01-09

## 2025-01-09 NOTE — TELEPHONE ENCOUNTER
Received request via: Pharmacy    Was the patient seen in the last year in this department? Yes    Does the patient have an active prescription (recently filled or refills available) for medication(s) requested? No    [facility]     Does the patient have [facility] Plus and need 100-day supply? (This applies to ALL medications) Patient does not have SCP

## 2025-03-05 ENCOUNTER — RESEARCH ENCOUNTER (OUTPATIENT)
Dept: RESEARCH | Facility: MEDICAL CENTER | Age: 70
End: 2025-03-05
Payer: MEDICARE

## 2025-03-05 DIAGNOSIS — Z00.6 CLINICAL TRIAL PARTICIPANT: ICD-10-CM

## 2025-04-03 DIAGNOSIS — I10 PRIMARY HYPERTENSION: ICD-10-CM

## 2025-04-03 NOTE — TELEPHONE ENCOUNTER
Received request via: Pharmacy    Was the patient seen in the last year in this department? No    Does the patient have an active prescription (recently filled or refills available) for medication(s) requested? No    Pharm[facility] Name: [facility]     Does the patient have [facility] Plus and need 100-day supply? (This applies to ALL medications) Patient does not have SCP

## 2025-04-04 RX ORDER — LOSARTAN POTASSIUM 25 MG/1
25 TABLET ORAL DAILY
Qty: 90 TABLET | Refills: 0 | Status: SHIPPED | OUTPATIENT
Start: 2025-04-04

## 2025-06-05 ENCOUNTER — APPOINTMENT (OUTPATIENT)
Dept: LAB | Facility: MEDICAL CENTER | Age: 70
End: 2025-06-05
Attending: FAMILY MEDICINE

## 2025-06-06 ENCOUNTER — HOSPITAL ENCOUNTER (OUTPATIENT)
Dept: LAB | Facility: MEDICAL CENTER | Age: 70
End: 2025-06-06
Attending: FAMILY MEDICINE
Payer: MEDICARE

## 2025-06-06 DIAGNOSIS — Z00.6 CLINICAL TRIAL PARTICIPANT: ICD-10-CM

## 2025-06-10 ENCOUNTER — TELEPHONE (OUTPATIENT)
Dept: MEDICAL GROUP | Facility: PHYSICIAN GROUP | Age: 70
End: 2025-06-10

## 2025-06-10 ENCOUNTER — OFFICE VISIT (OUTPATIENT)
Dept: MEDICAL GROUP | Facility: PHYSICIAN GROUP | Age: 70
End: 2025-06-10
Payer: MEDICARE

## 2025-06-10 VITALS
TEMPERATURE: 98.4 F | WEIGHT: 149.91 LBS | OXYGEN SATURATION: 98 % | RESPIRATION RATE: 12 BRPM | HEART RATE: 78 BPM | DIASTOLIC BLOOD PRESSURE: 74 MMHG | BODY MASS INDEX: 26.56 KG/M2 | HEIGHT: 63 IN | SYSTOLIC BLOOD PRESSURE: 128 MMHG

## 2025-06-10 DIAGNOSIS — Z87.898 HX OF MOTION SICKNESS: ICD-10-CM

## 2025-06-10 DIAGNOSIS — E78.2 MIXED HYPERLIPIDEMIA: ICD-10-CM

## 2025-06-10 DIAGNOSIS — E03.9 HYPOTHYROIDISM, UNSPECIFIED TYPE: ICD-10-CM

## 2025-06-10 DIAGNOSIS — T75.3XXA MOTION SICKNESS, INITIAL ENCOUNTER: ICD-10-CM

## 2025-06-10 DIAGNOSIS — Z12.31 SCREENING MAMMOGRAM FOR BREAST CANCER: ICD-10-CM

## 2025-06-10 DIAGNOSIS — M25.562 CHRONIC PAIN OF LEFT KNEE: ICD-10-CM

## 2025-06-10 DIAGNOSIS — E55.9 VITAMIN D DEFICIENCY: ICD-10-CM

## 2025-06-10 DIAGNOSIS — N18.2 STAGE 2 CHRONIC KIDNEY DISEASE: ICD-10-CM

## 2025-06-10 DIAGNOSIS — I10 PRIMARY HYPERTENSION: Primary | ICD-10-CM

## 2025-06-10 DIAGNOSIS — G89.29 CHRONIC PAIN OF LEFT KNEE: ICD-10-CM

## 2025-06-10 DIAGNOSIS — R91.1 PULMONARY NODULE 1 CM OR GREATER IN DIAMETER: ICD-10-CM

## 2025-06-10 DIAGNOSIS — G47.20 SLEEP-WAKE 24 HOUR CYCLE DISRUPTION: ICD-10-CM

## 2025-06-10 DIAGNOSIS — Z23 NEED FOR VACCINATION: ICD-10-CM

## 2025-06-10 DIAGNOSIS — Z00.00 ANNUAL PHYSICAL EXAM: ICD-10-CM

## 2025-06-10 PROCEDURE — 3078F DIAST BP <80 MM HG: CPT | Performed by: STUDENT IN AN ORGANIZED HEALTH CARE EDUCATION/TRAINING PROGRAM

## 2025-06-10 PROCEDURE — 90471 IMMUNIZATION ADMIN: CPT | Performed by: STUDENT IN AN ORGANIZED HEALTH CARE EDUCATION/TRAINING PROGRAM

## 2025-06-10 PROCEDURE — 99214 OFFICE O/P EST MOD 30 MIN: CPT | Mod: 25 | Performed by: STUDENT IN AN ORGANIZED HEALTH CARE EDUCATION/TRAINING PROGRAM

## 2025-06-10 PROCEDURE — 3074F SYST BP LT 130 MM HG: CPT | Performed by: STUDENT IN AN ORGANIZED HEALTH CARE EDUCATION/TRAINING PROGRAM

## 2025-06-10 PROCEDURE — 90715 TDAP VACCINE 7 YRS/> IM: CPT | Performed by: STUDENT IN AN ORGANIZED HEALTH CARE EDUCATION/TRAINING PROGRAM

## 2025-06-10 RX ORDER — ESZOPICLONE 3 MG/1
TABLET, FILM COATED ORAL
COMMUNITY
End: 2025-06-10

## 2025-06-10 RX ORDER — ZIPRASIDONE HYDROCHLORIDE 20 MG/1
CAPSULE ORAL
COMMUNITY
End: 2025-06-10

## 2025-06-10 RX ORDER — CLONAZEPAM 1 MG/1
TABLET ORAL
COMMUNITY
End: 2025-06-10

## 2025-06-10 RX ORDER — QUETIAPINE FUMARATE 300 MG/1
TABLET, FILM COATED ORAL
COMMUNITY
End: 2025-06-10

## 2025-06-10 RX ORDER — CLONIDINE HYDROCHLORIDE 0.1 MG/1
TABLET ORAL
COMMUNITY
End: 2025-06-10

## 2025-06-10 RX ORDER — SCOPOLAMINE 1 MG/3D
1 PATCH, EXTENDED RELEASE TRANSDERMAL
Qty: 4 PATCH | Refills: 0 | Status: SHIPPED | OUTPATIENT
Start: 2025-06-10

## 2025-06-10 RX ORDER — AMLODIPINE BESYLATE AND ATORVASTATIN CALCIUM 5; 20 MG/1; MG/1
1 TABLET, FILM COATED ORAL
COMMUNITY

## 2025-06-10 RX ORDER — TRAZODONE HYDROCHLORIDE 150 MG/1
TABLET ORAL
COMMUNITY
End: 2025-06-10

## 2025-06-10 RX ORDER — SIROLIMUS 2 MG/1
TABLET, FILM COATED ORAL
COMMUNITY
Start: 2025-04-24

## 2025-06-10 RX ORDER — LOSARTAN POTASSIUM 25 MG/1
25 TABLET ORAL DAILY
COMMUNITY

## 2025-06-10 RX ORDER — LISINOPRIL 10 MG/1
TABLET ORAL
COMMUNITY
End: 2025-06-10

## 2025-06-10 RX ORDER — SIMVASTATIN 20 MG
TABLET ORAL
COMMUNITY
End: 2025-06-10

## 2025-06-10 RX ORDER — QUETIAPINE 150 MG/1
1 TABLET, FILM COATED, EXTENDED RELEASE ORAL
COMMUNITY
End: 2025-06-10

## 2025-06-10 RX ORDER — ALPHA LIPOIC ACID 300 MG
CAPSULE ORAL
COMMUNITY
End: 2025-06-10

## 2025-06-10 RX ORDER — HYDROCHLOROTHIAZIDE 25 MG/1
TABLET ORAL
COMMUNITY
End: 2025-06-10

## 2025-06-10 ASSESSMENT — FIBROSIS 4 INDEX: FIB4 SCORE: 1.43

## 2025-06-10 NOTE — PROGRESS NOTES
Verbal Consent given for [employer] recording software    HISTORY OF PRESENT ILLNESS: Estefani is a pleasant 70 y.o. female, established patient who presents today to discuss medical problems as listed below:    History of Present Illness  The patient presents for a follow-up visit.    Diagnosed with a genotype indicative of increased dementia risk. Neurologist did not recommend PET scan due to elevated p-tau 217 and 181 levels. PET scan not FDA approved. Dissatisfied with neurologist's treatment options. Decided against amyloid PET scan due to cost. Plans to monitor p-tau levels and implement lifestyle changes.    Managing osteoporosis with Fosamax 70 mg for 4 years, plans to continue for 5 years. Last DEXA scan in 02/2024, next scheduled for 02/2025.    Two lung nodules diagnosed 1.5 years ago, followed up once. CT scan on 01/29/2023 showed 6 mm and 15 mm right lower lobe pulmonary nodules. Plans to recheck for growth.    Noticed a mobile lump, wants examination. No discomfort or pain.    Blood pressure well-controlled on losartan 25 mg and amlodipine 10 mg, occasionally drops slightly.    Recently underwent colonoscopy. Due for tetanus vaccine, received pneumonia vaccine at [place].    Prescribed Stugeron for motion sickness during cruise, effective. Planning another cruise, wants transdermal scopolamine patches.    Interested in Belsomra for sleep aid during flight to [place]. Currently taking mirtazapine 7.5 mg for sleep, previously used Lunesta.    Referred for orthotics, helped immensely with knee pain. Left knee problem persists, difficulty hiking downhill and going downstairs. Informal x-ray indicated misalignment. Working on it for 2 years, no improvement. Goes to gym, can push body weight.       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    Positive colorectal cancer screening using Cologuard test 12/28/2023    Healthcare maintenance 12/07/2023    Simple renal cyst 08/10/2023    Hyperlipidemia     Atherosclerosis of abdominal aorta (HCC) 05/31/2023    Hypothyroidism 01/30/2023    Cyst of left ovary 01/30/2023    Pulmonary nodule 1 cm or greater in diameter 01/30/2023    Primary hypertension 01/30/2023    Age-related osteoporosis without current pathological fracture 01/30/2023    Stage 2 chronic kidney disease 01/30/2023     Past Surgical History[2]  Social History[3]   Family History   Problem Relation Age of Onset    Cancer Other         breast    Heart Disease Mother         heart failure    Hypertension Father         high blood pressure    Hyperlipidemia Father         high blood pressure    Genetic Disorder Maternal Grandmother         Dementia - I am a APOE4 heterozygote    Genetic Disorder Maternal Aunt         Dementia    Genetic Disorder Maternal Uncle         Dementia     Current Medications[4]    Allergies:  Allergies[5]    Allergies, past medical history, past surgical history, family history, social history reviewed and updated.    Objective:    /74   Pulse 78   Temp 36.9 °C (98.4 °F) (Temporal)   Resp 12   Ht 1.588 m (5' 2.5\")   Wt 68 kg (149 lb 14.6 oz)   SpO2 98%   BMI 26.98 kg/m²    Body mass index is 26.98 kg/m².    Physical exam:  General: Normal appearance, no acute distress, not ill-appearing  HEENT: EOM intact, conjunctiva normal limits, negative right/left eye discharge.  Sclera anicteric  Cardiovascular: Normal rate and rhythm, no murmurs  Pulmonary: No respiratory distress, no wheezing, no rales, breath sounds normal.  Musculoskeletal: No edema bilaterally  Skin: soft rubbery mass movable no tenderness or erythema   Neurological: No focal deficits, normal gait  Psychiatric: Mood within normal limits    Assessment/Plan:    Assessment & Plan  1. Dementia risk: Genotype associated " with increased risk of dementia. Elevated p-tau levels.  - saw neurology who had offered amyloid scan but she declines     2. Osteoporosis: Stable, improving   - Continue Fosamax 70 mg for 1 more year.    3. Hypothyroidism.  - Labs to monitor thyroid function.  - Continue levothyroxine.    4. Pulmonary nodules.  - Schedule follow-up CT scan.  - had done one in 2023 showing 2 nodules recommend f/u 1 year  - low risk     5. lipoma  - Monitor for changes; consider removal if enlarges.    6. Hypertension: Well-controlled.  - Continue losartan 25 mg and amlodipine 10 mg.    7. Health maintenance.  - Schedule mammogram.    8. Motion sickness.  - Provide transdermal scopolamine patches for upcoming cruise.    9. Insomnia.  - Prescribe Belsomra 10 mg for sleep during trip.    10. Left knee pain.  - Order x-ray to evaluate cause, possibly arthritis.    Follow-up  - Annual wellness visit in July.       Problem List Items Addressed This Visit       Hypothyroidism    Relevant Orders    TSH    FREE THYROXINE    Pulmonary nodule 1 cm or greater in diameter    Relevant Orders    CT-CHEST (THORAX) W/O    Primary hypertension - Primary    Relevant Medications    amlodipine-atorvastatatin (CADUET) 5-20 MG per tablet    losartan (COZAAR) 25 MG Tab    Other Relevant Orders    Comp Metabolic Panel    Stage 2 chronic kidney disease    Relevant Orders    Comp Metabolic Panel    Hyperlipidemia    Relevant Medications    amlodipine-atorvastatatin (CADUET) 5-20 MG per tablet    losartan (COZAAR) 25 MG Tab    Other Relevant Orders    Lipid Profile     Other Visit Diagnoses         Vitamin D deficiency        Relevant Orders    VITAMIN D,25 HYDROXY (DEFICIENCY)      Annual physical exam        Relevant Orders    CBC WITHOUT DIFFERENTIAL    HEMOGLOBIN A1C      Screening mammogram for breast cancer        Relevant Orders    MA-SCREENING MAMMO BILAT W/TOMOSYNTHESIS W/CAD      Chronic pain of left knee        Relevant Orders    DX-KNEE COMPLETE 4+  LEFT      Motion sickness, initial encounter          Hx of motion sickness        Relevant Medications    scopolamine (TRANSDERM SCOP, 1.5 MG,) 1 mg/72hr PATCH 72 HR      Sleep-wake 24 hour cycle disruption        Relevant Medications    Suvorexant 10 MG Tab      Need for vaccination        Relevant Orders    Tdap Vaccine =>8YO IM (Completed)            Return in about 3 months (around 9/10/2025), or if symptoms worsen or fail to improve, for AWV.        [1]   Current Outpatient Medications Ordered in Epic   Medication Sig Dispense Refill    sirolimus (RAPAMUNE) 2 MG Tab tablet       amlodipine-atorvastatatin (CADUET) 5-20 MG per tablet Take 1 Tablet by mouth every day. FOR 30 DAYS      losartan (COZAAR) 25 MG Tab Take 25 mg by mouth every day.      scopolamine (TRANSDERM SCOP, 1.5 MG,) 1 mg/72hr PATCH 72 HR Place 1 Patch on the skin every 72 hours. 4 Patch 0    Suvorexant 10 MG Tab Take 1 Tablet by mouth at bedtime as needed (insmonia) for up to 30 days. 8 Tablet 0    alendronate (FOSAMAX) 70 MG Tab TAKE ONE TABLET BY MOUTH ONCE WEEKLY (EVERY 7 DAYS) 12 Tablet 1    ezetimibe (ZETIA) 10 MG Tab TAKE 1 TABLET BY MOUTH ONCE A DAY 90 Tablet 2    levothyroxine (SYNTHROID) 112 MCG Tab TAKE ONE TABLET BY MOUTH IN THE MORNING ON AN EMPTY STOMACH. 100 Tablet 2    rosuvastatin (CRESTOR) 10 MG Tab TAKE ONE TABLET BY MOUTH ONE TIME DAILY 90 Tablet 0    Omega-3 Fish Oil (OMEGA 3 FATTY ACIDS) 1000 MG Cap Take 1,000 mg by mouth every day.      Cholecalciferol (VITAMIN D3) 1000 UNIT Cap Take 3,000 Units by mouth every day.      Multiple Vitamin (MULTIVITAMIN ADULT) Tab Take 1 Tablet by mouth every day.      Magnesium 400 MG Tab Take 800 mg by mouth every day.      progesterone (PROMETRIUM) 100 MG Cap Take 100 mg by mouth every day.      estradiol (CLIMARA) 0.05 MG/24HR PATCH WEEKLY Place 1 Patch on the skin every 7 days.      ascorbic acid (ASCORBIC ACID) 500 MG Tab Take 3 Tablets by mouth every day. 30 Tablet 0    mirtazapine  (REMERON) 7.5 MG tablet Take 7.5 mg by mouth every evening.       No current Epic-ordered facility-administered medications on file.   [2]   Past Surgical History:  Procedure Laterality Date    HYSTERECTOMY ROBOTIC XI  2/29/2024    Procedure: ROBOTIC TOTAL HYSTERECTOMY BILATERAL SALPINGO OOPHORECTOMY,;  Surgeon: Fer Waite M.D.;  Location: SURGERY [facility];  Service: Gyn Robotic    PRIMARY C SECTION      TUBAL COAGULATION LAPAROSCOPIC BILATERAL     [3]   Social History  Tobacco Use    Smoking status: Never    Smokeless tobacco: Never    Tobacco comments:     2 months in college   Vaping Use    Vaping status: Never Used   Substance Use Topics    Alcohol use: Not Currently     Comment: Neuro toxin - I do not drink    Drug use: Never   [4]   Current Outpatient Medications   Medication Sig Dispense Refill    sirolimus (RAPAMUNE) 2 MG Tab tablet       amlodipine-atorvastatatin (CADUET) 5-20 MG per tablet Take 1 Tablet by mouth every day. FOR 30 DAYS      losartan (COZAAR) 25 MG Tab Take 25 mg by mouth every day.      scopolamine (TRANSDERM SCOP, 1.5 MG,) 1 mg/72hr PATCH 72 HR Place 1 Patch on the skin every 72 hours. 4 Patch 0    Suvorexant 10 MG Tab Take 1 Tablet by mouth at bedtime as needed (insmonia) for up to 30 days. 8 Tablet 0    alendronate (FOSAMAX) 70 MG Tab TAKE ONE TABLET BY MOUTH ONCE WEEKLY (EVERY 7 DAYS) 12 Tablet 1    ezetimibe (ZETIA) 10 MG Tab TAKE 1 TABLET BY MOUTH ONCE A DAY 90 Tablet 2    levothyroxine (SYNTHROID) 112 MCG Tab TAKE ONE TABLET BY MOUTH IN THE MORNING ON AN EMPTY STOMACH. 100 Tablet 2    rosuvastatin (CRESTOR) 10 MG Tab TAKE ONE TABLET BY MOUTH ONE TIME DAILY 90 Tablet 0    Omega-3 Fish Oil (OMEGA 3 FATTY ACIDS) 1000 MG Cap Take 1,000 mg by mouth every day.      Cholecalciferol (VITAMIN D3) 1000 UNIT Cap Take 3,000 Units by mouth every day.      Multiple Vitamin (MULTIVITAMIN ADULT) Tab Take 1 Tablet by mouth every day.      Magnesium 400 MG Tab Take 800 mg by mouth every day.       "progesterone (PROMETRIUM) 100 MG Cap Take 100 mg by mouth every day.      estradiol (CLIMARA) 0.05 MG/24HR PATCH WEEKLY Place 1 Patch on the skin every 7 days.      ascorbic acid (ASCORBIC ACID) 500 MG Tab Take 3 Tablets by mouth every day. 30 Tablet 0    mirtazapine (REMERON) 7.5 MG tablet Take 7.5 mg by mouth every evening.       No current facility-administered medications for this visit.   [5]   Allergies  Allergen Reactions    Midazolam Unspecified     \"opposite reaction\"    Promethazine Unspecified     \"opposite reaction\".     "

## 2025-06-10 NOTE — TELEPHONE ENCOUNTER
[facility] pharmacy in [place] would like clarification and a new script for Suvorexant 10 MG Oral Tablet. They said they unable to break the box. A box comes with 30 tablets.    [facility] pharmacy in [place] called in regards to patient stating they received a cancellation for Cozaar. Pharmacist states that this medication has been dispensed and they would like a call back for clarification. Please advise

## 2025-06-10 NOTE — PROGRESS NOTES
Subjective:     CC:   Chief Complaint   Patient presents with   • Requesting Labs       HPI:   Estefani Ho is a 70 y.o. female who presents for annual exam. She is feeling well and denies any complaints.    Ob-Gyn/ History:    Patient has GYN provider: {YES/NO:63}  /Para:  ***  Last Pap Smear:  ***. *** history of abnormal pap smears.  Gyn Surgery:  ***.  Current Contraceptive Method:  ***. *** currently sexually active.  Last menstrual period:  ***.  Periods regular. {Description; bleeding vaginal:34460} bleeding. Cramping is {MILD/MOD/SEVERE/VARIABLE:43942}.   She {DOES/DOES NOT:76294} take OTC analgesics for cramps.  No significant bloating/fluid retention, pelvic pain, or dyspareunia. No vaginal discharge  Post-menopausal bleeding: ***  Urinary incontinence: ***  Folate intake: *** {F childbearing age folate 0.4-0.8mg daily. USPSTF: A}    Health Maintenance  Advanced directive: *** {age >=65}  Osteoporosis Screen/ DEXA: *** {once 65 + or risk factors, frax tool}  PT/vit D for falls prevention: *** {65+ if higher risk for falls}  Cholesterol Screening: *** {45+ yo, q5yr}  Diabetes Screening: *** {40-71 yo who are overweight or obese}  Diet: *** {BMI >=25 diet & physical activity counseling, BMI >=30 offer or referral for intervention. USPSTF: B}  Exercise: *** {BMI >=25 diet & physical activity counseling, BMI >=30 offer or referral for intervention. USPSTF: B}  Substance Abuse: *** {counseling if alcohol misuse. USPSTF: B}  Safe in relationship. {F childbearing age intimate partner violence screening. USPSTF: B}  Seat belts, bike helmet, gun safety discussed.  Sun protection used.    Cancer screening  Colorectal Cancer Screening: ***  {45-75 yearly FIT testing, colonoscopy every 10 years, or flex sig every 5 years. USPSTF: A. 45-75 if /. PN Policy}  Lung Cancer Screening: ***  {50-80 yearly screen with 20 pack-years, currently smoke or quit <15 years. USPSTF:  B}  Cervical Cancer Screening: *** {21-65 every 3-5 years if nl screening. USPSTF: A}  Breast Cancer Screening: *** {50-75 mammography every 2 years. USPSTF: B.  For age 40-50: out of 1000 women, 1 with BC saved.  2 will be dx/tx that wouldn't have caused problems or needed tx. 67 with normal bx. 576 with false positive mammo}    Infectious disease screening/Immunizations  --STI Screening: *** {GC/CT F16-24 yearly, USPSTF: B/A}  --Practices safe sex.  --HIV Screening: *** {15-65 once. USPSTF: A}  --Hepatitis C Screening: *** {4888-4915 once. USPSTF: B}  --Immunizations:    Influenza: *** {yearly}   HPV:  *** {F18-26, M18-21, M to 26 may be vaccinated, 3 doses}   Tetanus: *** {every ten years with at least one tdap}   Shingles: n/a {once, >50, should call insurance or go through pharmacy}   Pneumococcal : ***      {vkk27-53, categories: (updated 7/13/2015)   1. Chronic heart/lung/liver disease, DM, alcoholism, cigarette smoking =>   recommend PPSV23 (Pneumovax) only    --give PPSV23   2. CSF leak, cochlear implant => recommend both vaccines    --if previous PPSV23, give PCV13 (Prevnar) >=1yr after PPSV23    --if neither, give PCV13, then PPSV23 >=8wk later   3. Sickle disease/hemoglobinopathies, asplenia, immunodeficiencies, HIV,    CKD, leukemia, lymphoma/Hodgkin, malignancy, iatrogenic immunodef,   solid organ transplant, mult myeloma => recommend both vaccines plus   2nd PPSV23    --if previous PPSV23, give PCV13 >=1yr after PPSV23    --give 2nd PPSV23 >=8wk after PCV13 and >=5yr after previous PPSV23    --if none previously, give PCV13 now and give PPSV23 >=8wk later    --give PPSV23 >=5yr after previous PPSV23. CDC/ACIP}       {age>=65:   1. Neither vaccine or unknown hx => recommend both vaccines    --PCV13 (Prevnar) now, PPSV23 (Pneumovax) >=1yr later   2. PPSV23 before age 65, no PCV13 => recommend both vaccines    --PCV >=1yr after PPSV23    --2nd PPSV23 >=1yr after PCV13 and >=5yr after previous PPSV23   3.  PPSV23 after age 65, no PCV13 => recommend PCV13 only    --PVC13 >=1yr after PPSV23   4. PCV13 and PPSV23 before age 65 => recommend PPSV23 only    --PPSV23 >=1yr after PCV13 and >=5 years after previous PPSV23   5. PCV13 and PPSV23 after age 65 => done! CDC/ACIP}   COVID-19: ***  Other immunizations: *** {Consider meningococcal, hepatitis A, hepatitis B, HIB.}    She  has a past medical history of Cataract (02/28/2024), High cholesterol, Hyperlipidemia, Hypertension, Kidney disease, Positive colorectal cancer screening using Cologuard test (12/28/2023), and Thyroid disease.  She  has a past surgical history that includes primary c section; tubal coagulation laparoscopic bilateral; and hysterectomy robotic xi (2/29/2024).    Family History   Problem Relation Age of Onset   • Cancer Other         breast   • Heart Disease Mother         heart failure   • Hypertension Father         high blood pressure   • Hyperlipidemia Father         high blood pressure   • Genetic Disorder Maternal Grandmother         Dementia - I am a APOE4 heterozygote   • Genetic Disorder Maternal Aunt         Dementia   • Genetic Disorder Maternal Uncle         Dementia       Social History     Socioeconomic History   • Marital status:      Spouse name: Not on file   • Number of children: Not on file   • Years of education: Not on file   • Highest education level: Bachelor's degree (e.g., BA, AB, BS)   Occupational History   • Not on file   Tobacco Use   • Smoking status: Never   • Smokeless tobacco: Never   • Tobacco comments:     2 months in college   Vaping Use   • Vaping status: Never Used   Substance and Sexual Activity   • Alcohol use: Not Currently     Comment: Neuro toxin - I do not drink   • Drug use: Never   • Sexual activity: Not Currently     Comment: tbal  ligation 25 years ago   Other Topics Concern   • Not on file   Social History Narrative   • Not on file     Social Drivers of Health     Financial Resource Strain: Low Risk   (7/9/2023)    Overall Financial Resource Strain (CARDIA)    • Difficulty of Paying Living Expenses: Not very hard   Food Insecurity: No Food Insecurity (7/9/2023)    Hunger Vital Sign    • Worried About Running Out of Food in the Last Year: Never true    • Ran Out of Food in the Last Year: Never true   Transportation Needs: No Transportation Needs (7/9/2023)    PRAPARE - Transportation    • Lack of Transportation (Medical): No    • Lack of Transportation (Non-Medical): No   Physical Activity: Sufficiently Active (7/9/2023)    Exercise Vital Sign    • Days of Exercise per Week: 7 days    • Minutes of Exercise per Session: 130 min   Stress: Stress Concern Present (7/9/2023)    [place] citizen [place] of Occupational Health - Occupational Stress Questionnaire    • Feeling of Stress : To some extent   Social Connections: Unknown (7/9/2023)    Social Connection and Isolation Panel [NHANES]    • Frequency of Communication with Friends and Family: Patient declined    • Frequency of Social Gatherings with Friends and Family: Once a week    • Attends [employer] Services: Never    • Active Member of Clubs or Organizations: Yes    • Attends Club or Organization Meetings: 1 to 4 times per year    • Marital Status:    Intimate Partner Violence: Low Risk  (1/28/2023)    Received from [facility]    History of Abuse    • History of Abuse: Denies   Housing Stability: High Risk (7/9/2023)    Housing Stability Vital Sign    • Unable to Pay for Housing in the Last Year: No    • Number of Places Lived in the Last Year: 3    • Unstable Housing in the Last Year: No       Patient Active Problem List    Diagnosis Date Noted   • Urge incontinence of urine 02/21/2024   • Bilateral impacted cerumen 02/21/2024   • APOE*3/*4 genotype 02/21/2024   • Positive colorectal cancer screening using Cologuard test 12/28/2023   • Healthcare maintenance 12/07/2023   • Simple renal cyst 08/10/2023   • Hyperlipidemia    • Atherosclerosis of  "abdominal aorta (HCC) 05/31/2023   • Hypothyroidism 01/30/2023   • Cyst of left ovary 01/30/2023   • Pulmonary nodule 1 cm or greater in diameter 01/30/2023   • Primary hypertension 01/30/2023   • Age-related osteoporosis without current pathological fracture 01/30/2023   • Stage 2 chronic kidney disease 01/30/2023         Current Medications[1]  Allergies[2]    ROS     Objective:     /74   Pulse 78   Temp 36.9 °C (98.4 °F) (Temporal)   Resp 12   Ht 1.588 m (5' 2.5\")   Wt 68 kg (149 lb 14.6 oz)   SpO2 98%   BMI 26.98 kg/m²   Body mass index is 26.98 kg/m².  Wt Readings from Last 4 Encounters:   06/10/25 68 kg (149 lb 14.6 oz)   02/29/24 69.9 kg (154 lb 1.6 oz)   02/21/24 71.2 kg (156 lb 15.5 oz)   12/06/23 71.6 kg (157 lb 13.6 oz)       Physical Exam     Assessment and Plan:     No problem-specific Assessment & Plan notes found for this encounter.      HCM:  ***   Labs per orders  Immunizations per orders      Patient Counseling:  --Discussed moderation in caloric intake, sufficient fresh fruits/vegetables, fiber, iron, and 0.4-0.8mg of folate supplement per day (for females capable of pregnancy).  --Discussed brushing, flossing, and dental visits.   --Encouraged regular exercise.   --Discussed tobacco, alcohol, or other drug use.  --Discussed sexually transmitted infections, partner selection, use of condoms, avoidance of unintended pregnancy and contraceptive alternatives.  --Injury prevention: Discussed safety belts, safety helmets, smoke detector, etc.      Follow-up: No follow-ups on file.           [1]  Current Outpatient Medications   Medication Sig Dispense Refill   • sirolimus (RAPAMUNE) 2 MG Tab tablet      • amlodipine-atorvastatatin (CADUET) 5-20 MG per tablet Take 1 Tablet by mouth every day. FOR 30 DAYS     • alendronate (FOSAMAX) 70 MG Tab TAKE ONE TABLET BY MOUTH ONCE WEEKLY (EVERY 7 DAYS) 12 Tablet 1   • ezetimibe (ZETIA) 10 MG Tab TAKE 1 TABLET BY MOUTH ONCE A DAY 90 Tablet 2   • " levothyroxine (SYNTHROID) 112 MCG Tab TAKE ONE TABLET BY MOUTH IN THE MORNING ON AN EMPTY STOMACH. 100 Tablet 2   • rosuvastatin (CRESTOR) 10 MG Tab TAKE ONE TABLET BY MOUTH ONE TIME DAILY 90 Tablet 0   • Omega-3 Fish Oil (OMEGA 3 FATTY ACIDS) 1000 MG Cap Take 1,000 mg by mouth every day.     • Cholecalciferol (VITAMIN D3) 1000 UNIT Cap Take 3,000 Units by mouth every day.     • Multiple Vitamin (MULTIVITAMIN ADULT) Tab Take 1 Tablet by mouth every day.     • Magnesium 400 MG Tab Take 800 mg by mouth every day.     • progesterone (PROMETRIUM) 100 MG Cap Take 100 mg by mouth every day.     • estradiol (CLIMARA) 0.05 MG/24HR PATCH WEEKLY Place 1 Patch on the skin every 7 days.     • ziprasidone (GEODON) 20 MG Cap  (Patient not taking: Reported on 6/10/2025)     • traZODone (DESYREL) 150 MG Tab TAKE 1&1/2 TABLET BY MOUTH AT BEDTIME (Patient not taking: Reported on 6/10/2025)     • simvastatin (ZOCOR) 20 MG Tab Take 1 tablet every day by oral route for 90 days. (Patient not taking: Reported on 6/10/2025)     • QUEtiapine Fumarate 150 MG TABLET SR 24 HR Take 1 Tablet by mouth every day. (Patient not taking: Reported on 6/10/2025)     • quetiapine (SEROQUEL) 300 MG tablet  (Patient not taking: Reported on 6/10/2025)     • lisinopril (PRINIVIL) 10 MG Tab Take 1 tablet every day by oral route for 90 days. (Patient not taking: Reported on 6/10/2025)     • hydroCHLOROthiazide 25 MG Tab  (Patient not taking: Reported on 6/10/2025)     • clonazePAM (KLONOPIN) 1 MG Tab  (Patient not taking: Reported on 6/10/2025)     • cloNIDine (CATAPRES) 0.1 MG Tab TAKE 1 TAB 3 TIMES PER DAY AS NEEDED IF SYSTOLIC  150 OR DIASTOLIC   90 (Patient not taking: Reported on 6/10/2025)     • Eszopiclone 3 MG Tab      • Alpha-Lipoic Acid 300 MG Cap Take 1 capsule every day by oral route. (Patient not taking: Reported on 6/10/2025)     • TURMERIC PO Take 500 mg by mouth. (Patient not taking: Reported on 6/10/2025)     • losartan (COZAAR) 25 MG Tab TAKE  "ONE TABLET BY MOUTH ONE TIME DAILY (Patient not taking: Reported on 6/10/2025) 90 Tablet 0   • amLODIPine (NORVASC) 10 MG Tab TAKE ONE TABLET BY MOUTH ONE TIME DAILY (Patient not taking: Reported on 6/10/2025) 90 Tablet 2   • Turmeric 500 MG Cap Take 500 mg by mouth every day. (Patient not taking: Reported on 6/10/2025)     • ascorbic acid (ASCORBIC ACID) 500 MG Tab Take 3 Tablets by mouth every day. 30 Tablet 0   • oxyCODONE immediate-release (ROXICODONE) 5 MG Tab Take 1 tablet by mouth every 6 hours as needed for pain. Rx to last 7 days. (Patient not taking: Reported on 6/10/2025) 28 Tablet 0   • ondansetron (ZOFRAN) 4 MG Tab tablet Take 1 tablet by mouth every 6 hours as needed for nausea (Patient not taking: Reported on 6/10/2025) 28 Tablet 0   • mirtazapine (REMERON) 7.5 MG tablet Take 7.5 mg by mouth every evening.       No current facility-administered medications for this visit.   [2]  Allergies  Allergen Reactions   • Midazolam Unspecified     \"opposite reaction\"   • Promethazine Unspecified     \"opposite reaction\".   "

## 2025-06-11 DIAGNOSIS — G47.20 SLEEP-WAKE 24 HOUR CYCLE DISRUPTION: Primary | ICD-10-CM

## 2025-06-11 LAB
ELF SCORE: 9.1 -
HA (HYALURONIC ACID): 43.56 NG/ML
PIIINP (AMINO-TERMINAL PROPEPTIDE): 7.65 NG/ML
RELATIVE RISK: NORMAL
RISK GROUP: NORMAL
RISK: 3.3 %
TIMP-1 (TISSUE INHIBITOR OF MMP1): 199.4 NG/ML

## 2025-06-11 RX ORDER — ESZOPICLONE 1 MG/1
1 TABLET, FILM COATED ORAL
Qty: 10 TABLET | Refills: 0 | Status: SHIPPED | OUTPATIENT
Start: 2025-06-11 | End: 2025-06-21

## 2025-06-12 ENCOUNTER — RESULTS FOLLOW-UP (OUTPATIENT)
Dept: MEDICAL GROUP | Facility: PHYSICIAN GROUP | Age: 70
End: 2025-06-12
Payer: MEDICARE

## 2025-06-20 LAB
APOB+LDLR+PCSK9 GENE MUT ANL BLD/T: NOT DETECTED
BRCA1+BRCA2 DEL+DUP + FULL MUT ANL BLD/T: NOT DETECTED
MLH1+MSH2+MSH6+PMS2 GN DEL+DUP+FUL M: NOT DETECTED

## 2025-07-07 ENCOUNTER — HOSPITAL ENCOUNTER (OUTPATIENT)
Dept: LAB | Facility: MEDICAL CENTER | Age: 70
End: 2025-07-07
Attending: STUDENT IN AN ORGANIZED HEALTH CARE EDUCATION/TRAINING PROGRAM
Payer: MEDICARE

## 2025-07-07 ENCOUNTER — HOSPITAL ENCOUNTER (OUTPATIENT)
Dept: RADIOLOGY | Facility: MEDICAL CENTER | Age: 70
End: 2025-07-07
Attending: STUDENT IN AN ORGANIZED HEALTH CARE EDUCATION/TRAINING PROGRAM
Payer: MEDICARE

## 2025-07-07 ENCOUNTER — HOSPITAL ENCOUNTER (OUTPATIENT)
Facility: MEDICAL CENTER | Age: 70
End: 2025-07-07
Attending: STUDENT IN AN ORGANIZED HEALTH CARE EDUCATION/TRAINING PROGRAM
Payer: MEDICARE

## 2025-07-07 DIAGNOSIS — I10 PRIMARY HYPERTENSION: ICD-10-CM

## 2025-07-07 DIAGNOSIS — E03.9 HYPOTHYROIDISM, UNSPECIFIED TYPE: ICD-10-CM

## 2025-07-07 DIAGNOSIS — E78.2 MIXED HYPERLIPIDEMIA: ICD-10-CM

## 2025-07-07 DIAGNOSIS — M25.562 CHRONIC PAIN OF LEFT KNEE: ICD-10-CM

## 2025-07-07 DIAGNOSIS — G89.29 CHRONIC PAIN OF LEFT KNEE: ICD-10-CM

## 2025-07-07 DIAGNOSIS — E55.9 VITAMIN D DEFICIENCY: ICD-10-CM

## 2025-07-07 DIAGNOSIS — Z12.31 SCREENING MAMMOGRAM FOR BREAST CANCER: ICD-10-CM

## 2025-07-07 DIAGNOSIS — R91.1 PULMONARY NODULE 1 CM OR GREATER IN DIAMETER: ICD-10-CM

## 2025-07-07 DIAGNOSIS — Z00.00 ANNUAL PHYSICAL EXAM: ICD-10-CM

## 2025-07-07 DIAGNOSIS — N18.2 STAGE 2 CHRONIC KIDNEY DISEASE: ICD-10-CM

## 2025-07-07 LAB
25(OH)D3 SERPL-MCNC: 102 NG/ML (ref 30–100)
ALBUMIN SERPL BCP-MCNC: 4.4 G/DL (ref 3.2–4.9)
ALBUMIN/GLOB SERPL: 1.7 G/DL
ALP SERPL-CCNC: 54 U/L (ref 30–99)
ALT SERPL-CCNC: 24 U/L (ref 2–50)
ANION GAP SERPL CALC-SCNC: 11 MMOL/L (ref 7–16)
AST SERPL-CCNC: 21 U/L (ref 12–45)
BILIRUB SERPL-MCNC: 0.3 MG/DL (ref 0.1–1.5)
BUN SERPL-MCNC: 23 MG/DL (ref 8–22)
CALCIUM ALBUM COR SERPL-MCNC: 9.6 MG/DL (ref 8.5–10.5)
CALCIUM SERPL-MCNC: 9.9 MG/DL (ref 8.5–10.5)
CHLORIDE SERPL-SCNC: 105 MMOL/L (ref 96–112)
CHOLEST SERPL-MCNC: 249 MG/DL (ref 100–199)
CO2 SERPL-SCNC: 23 MMOL/L (ref 20–33)
CREAT SERPL-MCNC: 1.06 MG/DL (ref 0.5–1.4)
ERYTHROCYTE [DISTWIDTH] IN BLOOD BY AUTOMATED COUNT: 37.8 FL (ref 35.9–50)
EST. AVERAGE GLUCOSE BLD GHB EST-MCNC: 97 MG/DL
FASTING STATUS PATIENT QL REPORTED: NORMAL
GFR SERPLBLD CREATININE-BSD FMLA CKD-EPI: 56 ML/MIN/1.73 M 2
GLOBULIN SER CALC-MCNC: 2.6 G/DL (ref 1.9–3.5)
GLUCOSE SERPL-MCNC: 90 MG/DL (ref 65–99)
HBA1C MFR BLD: 5 % (ref 4–5.6)
HCT VFR BLD AUTO: 46.6 % (ref 37–47)
HDLC SERPL-MCNC: 84 MG/DL
HGB BLD-MCNC: 15.7 G/DL (ref 12–16)
LDLC SERPL CALC-MCNC: 146 MG/DL
MCH RBC QN AUTO: 28.4 PG (ref 27–33)
MCHC RBC AUTO-ENTMCNC: 33.7 G/DL (ref 32.2–35.5)
MCV RBC AUTO: 84.4 FL (ref 81.4–97.8)
PLATELET # BLD AUTO: 260 K/UL (ref 164–446)
PMV BLD AUTO: 10.1 FL (ref 9–12.9)
POTASSIUM SERPL-SCNC: 4.2 MMOL/L (ref 3.6–5.5)
PROT SERPL-MCNC: 7 G/DL (ref 6–8.2)
RBC # BLD AUTO: 5.52 M/UL (ref 4.2–5.4)
SODIUM SERPL-SCNC: 139 MMOL/L (ref 135–145)
T4 FREE SERPL-MCNC: 1.31 NG/DL (ref 0.93–1.7)
TRIGL SERPL-MCNC: 96 MG/DL (ref 0–149)
TSH SERPL-ACNC: 1.17 UIU/ML (ref 0.38–5.33)
WBC # BLD AUTO: 6.8 K/UL (ref 4.8–10.8)

## 2025-07-07 PROCEDURE — 36415 COLL VENOUS BLD VENIPUNCTURE: CPT

## 2025-07-07 PROCEDURE — 82306 VITAMIN D 25 HYDROXY: CPT

## 2025-07-07 PROCEDURE — 83036 HEMOGLOBIN GLYCOSYLATED A1C: CPT | Mod: GA

## 2025-07-07 PROCEDURE — 73564 X-RAY EXAM KNEE 4 OR MORE: CPT | Mod: LT

## 2025-07-07 PROCEDURE — 84443 ASSAY THYROID STIM HORMONE: CPT

## 2025-07-07 PROCEDURE — 80061 LIPID PANEL: CPT

## 2025-07-07 PROCEDURE — 80053 COMPREHEN METABOLIC PANEL: CPT

## 2025-07-07 PROCEDURE — 84439 ASSAY OF FREE THYROXINE: CPT

## 2025-07-07 PROCEDURE — 71250 CT THORAX DX C-: CPT

## 2025-07-07 PROCEDURE — 85027 COMPLETE CBC AUTOMATED: CPT

## 2025-07-07 PROCEDURE — 77063 BREAST TOMOSYNTHESIS BI: CPT

## 2025-07-08 ENCOUNTER — OFFICE VISIT (OUTPATIENT)
Dept: MEDICAL GROUP | Facility: PHYSICIAN GROUP | Age: 70
End: 2025-07-08
Payer: MEDICARE

## 2025-07-08 VITALS
HEART RATE: 96 BPM | OXYGEN SATURATION: 95 % | BODY MASS INDEX: 27.06 KG/M2 | SYSTOLIC BLOOD PRESSURE: 136 MMHG | WEIGHT: 147.05 LBS | DIASTOLIC BLOOD PRESSURE: 80 MMHG | TEMPERATURE: 97.1 F | RESPIRATION RATE: 12 BRPM | HEIGHT: 62 IN

## 2025-07-08 DIAGNOSIS — E78.2 MIXED HYPERLIPIDEMIA: ICD-10-CM

## 2025-07-08 DIAGNOSIS — R91.1 PULMONARY NODULE 1 CM OR GREATER IN DIAMETER: ICD-10-CM

## 2025-07-08 DIAGNOSIS — R94.4 DECREASED GFR: Primary | ICD-10-CM

## 2025-07-08 DIAGNOSIS — M17.11 ARTHRITIS OF RIGHT KNEE: ICD-10-CM

## 2025-07-08 PROCEDURE — 3079F DIAST BP 80-89 MM HG: CPT | Performed by: STUDENT IN AN ORGANIZED HEALTH CARE EDUCATION/TRAINING PROGRAM

## 2025-07-08 PROCEDURE — 3075F SYST BP GE 130 - 139MM HG: CPT | Performed by: STUDENT IN AN ORGANIZED HEALTH CARE EDUCATION/TRAINING PROGRAM

## 2025-07-08 PROCEDURE — 99214 OFFICE O/P EST MOD 30 MIN: CPT | Performed by: STUDENT IN AN ORGANIZED HEALTH CARE EDUCATION/TRAINING PROGRAM

## 2025-07-08 RX ORDER — AMLODIPINE BESYLATE 10 MG/1
10 TABLET ORAL DAILY
COMMUNITY
End: 2025-07-22 | Stop reason: SDUPTHER

## 2025-07-08 RX ORDER — ROSUVASTATIN CALCIUM 10 MG/1
10 TABLET, COATED ORAL DAILY
Qty: 90 TABLET | Refills: 0 | Status: SHIPPED | OUTPATIENT
Start: 2025-07-08 | End: 2025-07-08

## 2025-07-08 RX ORDER — ROSUVASTATIN CALCIUM 5 MG/1
5 TABLET, COATED ORAL DAILY
Qty: 100 TABLET | Refills: 2 | Status: SHIPPED | OUTPATIENT
Start: 2025-07-08

## 2025-07-08 ASSESSMENT — PATIENT HEALTH QUESTIONNAIRE - PHQ9: CLINICAL INTERPRETATION OF PHQ2 SCORE: 0

## 2025-07-08 ASSESSMENT — FIBROSIS 4 INDEX: FIB4 SCORE: 1.19

## 2025-07-09 NOTE — PROGRESS NOTES
Verbal Consent given for [employer] recording software    HISTORY OF PRESENT ILLNESS: Estefani is a pleasant 70 y.o. female, established patient who presents today to discuss medical problems as listed below:    History of Present Illness  The patient presents for follow-up on labs and imaging, primarily to review her recent CT scan and lab tests. She has a history of a lung nodule found on a CT scan of her abdomen in 2023, with sizes of 6 mm and 1.5 cm in the right lower lobe, recommended for follow-up in 3 months. A recent CT chest scan on 07/07/2025 shows a 1.2 cm nodule and a 7 mm nodule in the right lower lobe, indicating minimal change, and a new 4.1 mm nodule in the left upper lobe.    Recent labs from 07/07/2025 show decreased GFR at 56, creatinine at 1.06 (normal), hemoglobin at 15.7, hematocrit at 46.6, LDL at 146, HDL at 84, total cholesterol at 249, BUN increased at 23, vitamin D at 102, hemoglobin A1c at 5.0, and free T4 and TSH within normal limits. She attributes her reduced kidney function to inadequate water intake during fasting and plans to increase water consumption before her next test.    She reports hyperinflated lungs but no history of structural changes or COPD. Two weeks ago, she experienced shortness of breath, initially attributed to a cold with coughing and phlegm production. She had difficulty breathing at night for about 15 minutes, which recurred the next day.. She has no history of asthma or emphysema and recently completed a 10K walk. She maintains an active lifestyle, walking 30 miles regularly, and feels generally healthy. She smoked briefly in college and lived in [facility] for 5 years.     She is concerned about calcium deposits in her coronary arteries and is considering a CAC test. She requests a refill of rosuvastatin at a reduced dose of 5 mg, recalling good lipid control with 10 mg taken three times weekly.    She believes she has osteoarthritis in both knees, with the left  knee more affected. An x-ray of her left knee was previously performed. She reports no swelling and experiences pain only when hiking downhill. She engages in leg presses at the gym and initially thought her muscles were weak due to difficulty going downhill. She is considering weight loss as a treatment option. She avoids ibuprofen due to its impact on her kidneys but finds relief with Tylenol. She reports no pain when walking on flat surfaces, sitting, standing, or going uphill. She plans to use hiking poles for support.      SOCIAL HISTORY  - Smoked for 1 year in college    FAMILY HISTORY  - Mother had some arthritis       Current Medications and Prescriptions Ordered in Epic[1]    Review of systems:  Per HPI    Patient Active Problem List    Diagnosis Date Noted    Urge incontinence of urine 02/21/2024    Bilateral impacted cerumen 02/21/2024    APOE*3/*4 genotype 02/21/2024    Positive colorectal cancer screening using Cologuard test 12/28/2023    Healthcare maintenance 12/07/2023    Simple renal cyst 08/10/2023    Hyperlipidemia     Atherosclerosis of abdominal aorta (HCC) 05/31/2023    Hypothyroidism 01/30/2023    Cyst of left ovary 01/30/2023    Pulmonary nodule 1 cm or greater in diameter 01/30/2023    Primary hypertension 01/30/2023    Age-related osteoporosis without current pathological fracture 01/30/2023    Stage 2 chronic kidney disease 01/30/2023     Past Surgical History[2]  Social History[3]   Family History   Problem Relation Age of Onset    Cancer Other         breast    Heart Disease Mother         heart failure    Hypertension Father         high blood pressure    Hyperlipidemia Father         high blood pressure    Genetic Disorder Maternal Grandmother         Dementia - I am a APOE4 heterozygote    Dementia Maternal Grandmother     Genetic Disorder Maternal Aunt         Dementia    Dementia Maternal Aunt     Genetic Disorder Maternal Uncle         Dementia    Dementia Maternal Uncle   "    Current Medications[4]    Allergies:  Allergies[5]    Allergies, past medical history, past surgical history, family history, social history reviewed and updated.    Objective:    /80   Pulse 96   Temp 36.2 °C (97.1 °F) (Temporal)   Resp 12   Ht 1.575 m (5' 2\")   Wt 66.7 kg (147 lb 0.8 oz)   SpO2 95%   BMI 26.90 kg/m²    Body mass index is 26.9 kg/m².    Physical exam:  General: Normal appearance, no acute distress, not ill-appearing  HEENT: EOM intact, conjunctiva normal limits, negative right/left eye discharge.  Sclera anicteric  Cardiovascular: Normal rate and rhythm, no murmurs  Pulmonary: No respiratory distress, no wheezing, no rales, breath sounds normal.  Musculoskeletal: No edema bilaterally  Skin: Warm, dry, no lesions  Neurological: No focal deficits, normal gait  Psychiatric: Mood within normal limits    Assessment/Plan:    Assessment & Plan  1. Decreased GFR:   - GFR 56 on 07/07/2025, potentially due to fasting and inadequate hydration.  - Creatinine 1.06, BUN 23.  - Advised to stay hydrated and avoid fasting before tests.  - Repeat GFR test will be ordered.    2. Pulmonary nodules:   - CT scan on 07/07/2025 showed 1.2 cm and 7 mm nodules in the right lower lobe, minimal change.  - New 4.1 mm nodule in the left upper lobe previously not seen due to scan being a ct abdomen.   - No immediate intervention required- low risk.   - Follow-up CT scan in 1-2 years.    3. Hyperinflated lungs: Likely age-related.  - asymptomatic with good fitness   - Monitor for persistent shortness of breath or wheezing.  - No structural changes or COPD.  - Maintain current activity levels.    4. Coronary artery calcification:   - Interested in CAC score despite being on rosuvastatin.  - CAC test will be ordered.  - Continue rosuvastatin 5 mg.    5. Osteoarthritis: Moderate tricompartmental osteoarthritis in the left knee on xray  - Pain when hiking downhill.  - Use Tylenol for pain, consider ibuprofen " occasionally with hydration.  - Referral for physical therapy to strengthen quadriceps.  - Steroid injection if symptoms worsen.    6. Blood pressure management:   - Taking amlodipine 10 mg and losartan.  - Medication regimen confirmed.  - Refill for rosuvastatin 5 mg will be sent to pharmacy.    Follow-up  - Repeat GFR test will be ordered.  - Follow-up CT scan in 1-2 years.  - CAC test will be ordered.  - Referral for physical therapy.       Problem List Items Addressed This Visit       Pulmonary nodule 1 cm or greater in diameter    Hyperlipidemia    Relevant Medications    amLODIPine (NORVASC) 10 MG Tab    rosuvastatin (CRESTOR) 5 MG Tab    Other Relevant Orders    CT-HEART W/O CONT EVAL CALCIUM     Other Visit Diagnoses         Decreased GFR    -  Primary    Relevant Orders    Comp Metabolic Panel    MICROALBUMIN CREAT RATIO URINE      Arthritis of right knee        Relevant Orders    Referral to Physical Therapy            Return if symptoms worsen or fail to improve.          [1]   Current Outpatient Medications Ordered in Epic   Medication Sig Dispense Refill    amLODIPine (NORVASC) 10 MG Tab Take 10 mg by mouth every day.      rosuvastatin (CRESTOR) 5 MG Tab Take 1 Tablet by mouth every day. 100 Tablet 2    sirolimus (RAPAMUNE) 2 MG Tab tablet       losartan (COZAAR) 25 MG Tab Take 25 mg by mouth every day.      alendronate (FOSAMAX) 70 MG Tab TAKE ONE TABLET BY MOUTH ONCE WEEKLY (EVERY 7 DAYS) 12 Tablet 1    ezetimibe (ZETIA) 10 MG Tab TAKE 1 TABLET BY MOUTH ONCE A DAY 90 Tablet 2    levothyroxine (SYNTHROID) 112 MCG Tab TAKE ONE TABLET BY MOUTH IN THE MORNING ON AN EMPTY STOMACH. 100 Tablet 2    Omega-3 Fish Oil (OMEGA 3 FATTY ACIDS) 1000 MG Cap Take 1,000 mg by mouth every day.      Cholecalciferol (VITAMIN D3) 1000 UNIT Cap Take 3,000 Units by mouth every day.      Multiple Vitamin (MULTIVITAMIN ADULT) Tab Take 1 Tablet by mouth every day.      mirtazapine (REMERON) 7.5 MG tablet Take 7.5 mg by mouth  every evening.      progesterone (PROMETRIUM) 100 MG Cap Take 100 mg by mouth every day.      estradiol (CLIMARA) 0.05 MG/24HR PATCH WEEKLY Place 1 Patch on the skin every 7 days.      scopolamine (TRANSDERM SCOP, 1.5 MG,) 1 mg/72hr PATCH 72 HR Place 1 Patch on the skin every 72 hours. 4 Patch 0    Magnesium 400 MG Tab Take 800 mg by mouth every day.      ascorbic acid (ASCORBIC ACID) 500 MG Tab Take 3 Tablets by mouth every day. 30 Tablet 0     No current Epic-ordered facility-administered medications on file.   [2]   Past Surgical History:  Procedure Laterality Date    HYSTERECTOMY ROBOTIC XI  02/29/2024    Procedure: ROBOTIC TOTAL HYSTERECTOMY BILATERAL SALPINGO OOPHORECTOMY,;  Surgeon: Fer Waite M.D.;  Location: SURGERY [facility];  Service: Gyn Robotic    OTHER  finger surgery and breast biopsy    OTHER ABDOMINAL SURGERY  tubal ligation    PRIMARY C SECTION      TUBAL COAGULATION LAPAROSCOPIC BILATERAL     [3]   Social History  Tobacco Use    Smoking status: Never    Smokeless tobacco: Never    Tobacco comments:     2 months in college   Vaping Use    Vaping status: Never Used   Substance Use Topics    Alcohol use: Not Currently     Comment: Neuro toxin - I do not drink    Drug use: Never   [4]   Current Outpatient Medications   Medication Sig Dispense Refill    amLODIPine (NORVASC) 10 MG Tab Take 10 mg by mouth every day.      rosuvastatin (CRESTOR) 5 MG Tab Take 1 Tablet by mouth every day. 100 Tablet 2    sirolimus (RAPAMUNE) 2 MG Tab tablet       losartan (COZAAR) 25 MG Tab Take 25 mg by mouth every day.      alendronate (FOSAMAX) 70 MG Tab TAKE ONE TABLET BY MOUTH ONCE WEEKLY (EVERY 7 DAYS) 12 Tablet 1    ezetimibe (ZETIA) 10 MG Tab TAKE 1 TABLET BY MOUTH ONCE A DAY 90 Tablet 2    levothyroxine (SYNTHROID) 112 MCG Tab TAKE ONE TABLET BY MOUTH IN THE MORNING ON AN EMPTY STOMACH. 100 Tablet 2    Omega-3 Fish Oil (OMEGA 3 FATTY ACIDS) 1000 MG Cap Take 1,000 mg by mouth every day.      Cholecalciferol  "(VITAMIN D3) 1000 UNIT Cap Take 3,000 Units by mouth every day.      Multiple Vitamin (MULTIVITAMIN ADULT) Tab Take 1 Tablet by mouth every day.      mirtazapine (REMERON) 7.5 MG tablet Take 7.5 mg by mouth every evening.      progesterone (PROMETRIUM) 100 MG Cap Take 100 mg by mouth every day.      estradiol (CLIMARA) 0.05 MG/24HR PATCH WEEKLY Place 1 Patch on the skin every 7 days.      scopolamine (TRANSDERM SCOP, 1.5 MG,) 1 mg/72hr PATCH 72 HR Place 1 Patch on the skin every 72 hours. 4 Patch 0    Magnesium 400 MG Tab Take 800 mg by mouth every day.      ascorbic acid (ASCORBIC ACID) 500 MG Tab Take 3 Tablets by mouth every day. 30 Tablet 0     No current facility-administered medications for this visit.   [5]   Allergies  Allergen Reactions    Midazolam Unspecified     \"opposite reaction\"    Promethazine Unspecified     \"opposite reaction\".     "

## 2025-07-12 NOTE — Clinical Note
REFERRAL APPROVAL NOTICE         Sent on July 12, 2025                   Estefani Ho  [address]  [facility] [address]                   Dear Ms. Ho,    After a careful review of the medical information and benefit coverage, [facility] has processed your referral. See below for additional details.    If applicable, you must be actively enrolled with your insurance for coverage of the authorized service. If you have any questions regarding your coverage, please contact your insurance directly.    REFERRAL INFORMATION   Referral #:  [id number]  Referred-To Department    Referred-By Provider:  Physical Therapy    Aurosis PATRICK Simms.   Phys Therapy Op [facility]      [address]  [address]  [facility] [facility] [id number] [phone] [address]  [facility] [id number] [phone]    Referral Start Date:  07/08/2025  Referral End Date:   07/08/2026             SCHEDULING  If you do not already have an appointment, please call [phone] to make an appointment.     MORE INFORMATION  If you do not already have a [facility] account, sign up at: [email or web address]  You can access your medical information, make appointments, see lab results, billing information, and more.  If you have questions regarding this referral, please contact  the [facility] Referrals department at:             [phone]. Monday - Friday 8:00AM - 5:00PM.     Sincerely,    [facility]

## 2025-07-15 ENCOUNTER — HOSPITAL ENCOUNTER (OUTPATIENT)
Dept: LAB | Facility: MEDICAL CENTER | Age: 70
End: 2025-07-15
Attending: STUDENT IN AN ORGANIZED HEALTH CARE EDUCATION/TRAINING PROGRAM
Payer: MEDICARE

## 2025-07-15 DIAGNOSIS — R94.4 DECREASED GFR: ICD-10-CM

## 2025-07-15 LAB
ALBUMIN SERPL BCP-MCNC: 4.5 G/DL (ref 3.2–4.9)
ALBUMIN/GLOB SERPL: 2 G/DL
ALP SERPL-CCNC: 58 U/L (ref 30–99)
ALT SERPL-CCNC: 20 U/L (ref 2–50)
ANION GAP SERPL CALC-SCNC: 12 MMOL/L (ref 7–16)
AST SERPL-CCNC: 21 U/L (ref 12–45)
BILIRUB SERPL-MCNC: 0.3 MG/DL (ref 0.1–1.5)
BUN SERPL-MCNC: 19 MG/DL (ref 8–22)
CALCIUM ALBUM COR SERPL-MCNC: 8.9 MG/DL (ref 8.5–10.5)
CALCIUM SERPL-MCNC: 9.3 MG/DL (ref 8.5–10.5)
CHLORIDE SERPL-SCNC: 107 MMOL/L (ref 96–112)
CO2 SERPL-SCNC: 23 MMOL/L (ref 20–33)
CREAT SERPL-MCNC: 0.94 MG/DL (ref 0.5–1.4)
CREAT UR-MCNC: 166 MG/DL
GFR SERPLBLD CREATININE-BSD FMLA CKD-EPI: 64 ML/MIN/1.73 M 2
GLOBULIN SER CALC-MCNC: 2.3 G/DL (ref 1.9–3.5)
GLUCOSE SERPL-MCNC: 85 MG/DL (ref 65–99)
MICROALBUMIN UR-MCNC: <1.2 MG/DL
MICROALBUMIN/CREAT UR: NORMAL MG/G (ref 0–30)
POTASSIUM SERPL-SCNC: 4.1 MMOL/L (ref 3.6–5.5)
PROT SERPL-MCNC: 6.8 G/DL (ref 6–8.2)
SODIUM SERPL-SCNC: 142 MMOL/L (ref 135–145)

## 2025-07-15 PROCEDURE — 82043 UR ALBUMIN QUANTITATIVE: CPT

## 2025-07-15 PROCEDURE — 80053 COMPREHEN METABOLIC PANEL: CPT

## 2025-07-15 PROCEDURE — 82570 ASSAY OF URINE CREATININE: CPT

## 2025-07-22 RX ORDER — AMLODIPINE BESYLATE 10 MG/1
10 TABLET ORAL DAILY
Qty: 100 TABLET | Refills: 3 | Status: SHIPPED | OUTPATIENT
Start: 2025-07-22

## 2025-07-22 RX ORDER — LOSARTAN POTASSIUM 25 MG/1
25 TABLET ORAL DAILY
Qty: 100 TABLET | Refills: 3 | Status: SHIPPED | OUTPATIENT
Start: 2025-07-22

## 2025-07-22 RX ORDER — LOSARTAN POTASSIUM 25 MG/1
25 TABLET ORAL DAILY
Qty: 100 TABLET | Status: CANCELLED | OUTPATIENT
Start: 2025-07-22

## 2025-07-22 NOTE — TELEPHONE ENCOUNTER
Received request via: Pharmacy    Was the patient seen in the last year in this department? Yes    Does the patient have an active prescription (recently filled or refills available) for medication(s) requested? No    Pharmacy Name: [id number] - [address] [[id number]]     Does the patient have [facility] and need 100-day supply? (This applies to ALL medications) Patient does not have SCP

## (undated) DEVICE — TRAY SRGPRP PVP IOD WT PRP - (20/CA)

## (undated) DEVICE — DRESSING TRANSPARENT FILM TEGADERM 2.375 X 2.75"  (100EA/BX)"

## (undated) DEVICE — PAD OR TABLE DA VINCI 2IN X 20IN X 72IN - (12EA/CA)

## (undated) DEVICE — NEEDLE INSUFFLATION FOR STEP - (12/BX)

## (undated) DEVICE — NEEDLE DRIVER MEGA SUTURECUT DA VINCI 15X'S REUSABLE

## (undated) DEVICE — ARMREST CRADLE FOAM - (2PR/PK 12PR/CA)

## (undated) DEVICE — GLOVE SZ 6.5 BIOGEL PI MICRO - PF LF (50PR/BX)

## (undated) DEVICE — PACK TRENGUARD 450 PROCEDURE (12EA/CA)

## (undated) DEVICE — SUCTION INSTRUMENT YANKAUER BULBOUS TIP W/O VENT (50EA/CA)

## (undated) DEVICE — SYRINGE ASEPTO - (50EA/CA

## (undated) DEVICE — FORCEPS PROGRASP (18UN/EA)

## (undated) DEVICE — DRAPE ARM  BOX OF 20

## (undated) DEVICE — SPATULA PERMANENT CAUTERY DA VINCI 10X'S REUSABLE

## (undated) DEVICE — TUBING CLEARLINK DUO-VENT - C-FLO (48EA/CA)

## (undated) DEVICE — PACK GYN DAVINCI (1EA/CA)

## (undated) DEVICE — COVER FOOT UNIVERSAL DISP. - (25EA/CA)

## (undated) DEVICE — SENSOR OXIMETER ADULT SPO2 RD SET (20EA/BX)

## (undated) DEVICE — BAG RETRIEVAL 10ML (10EA/BX)

## (undated) DEVICE — CHLORAPREP 26 ML APPLICATOR - ORANGE TINT(25/CA)

## (undated) DEVICE — SET SUCTION/IRRIGATION WITH DISPOSABLE TIP (6/CA )PART #0250-070-520 IS A SUB

## (undated) DEVICE — BIPOLAR FORCE DA VINCI 12X'S REISABLE

## (undated) DEVICE — SLEEVE, VASO, THIGH, MED

## (undated) DEVICE — CANISTER SUCTION 3000ML MECHANICAL FILTER AUTO SHUTOFF MEDI-VAC NONSTERILE LF DISP  (40EA/CA)

## (undated) DEVICE — SEAL 5MM-8MM UNIVERSAL  BOX OF 10

## (undated) DEVICE — SYSTEM CLEARIFY VISUALIZATION (10EA/PK)

## (undated) DEVICE — DRAPE COLUMN  BOX OF 20

## (undated) DEVICE — SUTURE GENERAL

## (undated) DEVICE — DRESSING NON-ADHERING 8 X 3 - (50/BX)

## (undated) DEVICE — LACTATED RINGERS INJ 1000 ML - (14EA/CA 60CA/PF)

## (undated) DEVICE — SUTURE 3-0 MONOCRYL PLUS PS-1 - 27 INCH (36/BX)

## (undated) DEVICE — GLOVE BIOGEL PI INDICATOR SZ 6.5 SURGICAL PF LF - (50/BX 4BX/CA)

## (undated) DEVICE — ELECTRODE DUAL RETURN W/ CORD - (50/PK)

## (undated) DEVICE — COVER LIGHT HANDLE ALC PLUS DISP (18EA/BX)

## (undated) DEVICE — SET LEADWIRE 5 LEAD BEDSIDE DISPOSABLE ECG (1SET OF 5/EA)

## (undated) DEVICE — SET EXTENSION WITH 2 PORTS (48EA/CA) ***PART #2C8610 IS A SUBSTITUTE*****

## (undated) DEVICE — SUTURE QUILL 0 PDO 14X14 - (12/BX)

## (undated) DEVICE — TRAY CATHETER FOLEY URINE METER W/STATLOCK 350ML (10EA/CA)

## (undated) DEVICE — GLOVE COTTON STERILE (50/CA)

## (undated) DEVICE — GOWN WARMING STANDARD FLEX - (30/CA)

## (undated) DEVICE — GLOVE BIOGEL PI ORTHO SZ 7.5 PF LF (40PR/BX)

## (undated) DEVICE — GOWN SURGEONS X-LARGE - DISP. (30/CA)

## (undated) DEVICE — SLEEVE VASO CALF MED - (10PR/CA)